# Patient Record
Sex: FEMALE | Race: WHITE | NOT HISPANIC OR LATINO
[De-identification: names, ages, dates, MRNs, and addresses within clinical notes are randomized per-mention and may not be internally consistent; named-entity substitution may affect disease eponyms.]

---

## 2017-01-09 ENCOUNTER — MEDICATION RENEWAL (OUTPATIENT)
Age: 37
End: 2017-01-09

## 2017-01-27 ENCOUNTER — APPOINTMENT (OUTPATIENT)
Dept: MAMMOGRAPHY | Facility: CLINIC | Age: 37
End: 2017-01-27

## 2017-02-10 ENCOUNTER — APPOINTMENT (OUTPATIENT)
Dept: MAMMOGRAPHY | Facility: CLINIC | Age: 37
End: 2017-02-10

## 2017-02-21 ENCOUNTER — FORM ENCOUNTER (OUTPATIENT)
Age: 37
End: 2017-02-21

## 2017-02-22 ENCOUNTER — OUTPATIENT (OUTPATIENT)
Dept: OUTPATIENT SERVICES | Facility: HOSPITAL | Age: 37
LOS: 1 days | End: 2017-02-22
Payer: COMMERCIAL

## 2017-02-22 ENCOUNTER — APPOINTMENT (OUTPATIENT)
Dept: MAMMOGRAPHY | Facility: CLINIC | Age: 37
End: 2017-02-22

## 2017-02-22 DIAGNOSIS — Z00.8 ENCOUNTER FOR OTHER GENERAL EXAMINATION: ICD-10-CM

## 2017-02-22 PROCEDURE — 77067 SCR MAMMO BI INCL CAD: CPT

## 2017-02-22 PROCEDURE — 77063 BREAST TOMOSYNTHESIS BI: CPT

## 2017-03-20 ENCOUNTER — APPOINTMENT (OUTPATIENT)
Dept: ENDOCRINOLOGY | Facility: CLINIC | Age: 37
End: 2017-03-20

## 2017-03-20 VITALS
DIASTOLIC BLOOD PRESSURE: 68 MMHG | WEIGHT: 131 LBS | BODY MASS INDEX: 23.21 KG/M2 | HEART RATE: 68 BPM | TEMPERATURE: 97.7 F | SYSTOLIC BLOOD PRESSURE: 102 MMHG | HEIGHT: 63 IN

## 2017-03-20 LAB
BASOPHILS # BLD AUTO: 0.04 K/UL
BASOPHILS NFR BLD AUTO: 0.7 %
EOSINOPHIL # BLD AUTO: 0.11 K/UL
EOSINOPHIL NFR BLD AUTO: 1.8 %
GLUCOSE BLDC GLUCOMTR-MCNC: 103
HCT VFR BLD CALC: 35.2 %
HGB BLD-MCNC: 11.9 G/DL
IMM GRANULOCYTES NFR BLD AUTO: 0.2 %
LYMPHOCYTES # BLD AUTO: 2.01 K/UL
LYMPHOCYTES NFR BLD AUTO: 32.8 %
MAN DIFF?: NORMAL
MCHC RBC-ENTMCNC: 28.1 PG
MCHC RBC-ENTMCNC: 33.8 GM/DL
MCV RBC AUTO: 83 FL
MONOCYTES # BLD AUTO: 0.41 K/UL
MONOCYTES NFR BLD AUTO: 6.7 %
NEUTROPHILS # BLD AUTO: 3.54 K/UL
NEUTROPHILS NFR BLD AUTO: 57.8 %
PLATELET # BLD AUTO: 197 K/UL
RBC # BLD: 4.24 M/UL
RBC # FLD: 12.6 %
WBC # FLD AUTO: 6.12 K/UL

## 2017-03-20 RX ORDER — CHROMIUM 200 MCG
1000 TABLET ORAL DAILY
Qty: 90 | Refills: 3 | Status: ACTIVE | COMMUNITY
Start: 2017-03-20 | End: 1900-01-01

## 2017-03-21 LAB
25(OH)D3 SERPL-MCNC: 26.2 NG/ML
ALBUMIN SERPL ELPH-MCNC: 4.5 G/DL
ALP BLD-CCNC: 53 U/L
ALT SERPL-CCNC: 8 U/L
ANION GAP SERPL CALC-SCNC: 15 MMOL/L
AST SERPL-CCNC: 14 U/L
BILIRUB SERPL-MCNC: 0.5 MG/DL
BUN SERPL-MCNC: 15 MG/DL
CALCIUM SERPL-MCNC: 9.4 MG/DL
CHLORIDE SERPL-SCNC: 102 MMOL/L
CHOLEST SERPL-MCNC: 183 MG/DL
CHOLEST/HDLC SERPL: 2.5 RATIO
CO2 SERPL-SCNC: 22 MMOL/L
CREAT SERPL-MCNC: 0.74 MG/DL
CREAT SPEC-SCNC: 52 MG/DL
GLUCOSE SERPL-MCNC: 113 MG/DL
HBA1C MFR BLD HPLC: 7.6 %
HDLC SERPL-MCNC: 72 MG/DL
LDLC SERPL CALC-MCNC: 99 MG/DL
MICROALBUMIN 24H UR DL<=1MG/L-MCNC: 0.9 MG/DL
MICROALBUMIN/CREAT 24H UR-RTO: 17 UG/MG
POTASSIUM SERPL-SCNC: 4.1 MMOL/L
PROT SERPL-MCNC: 6.8 G/DL
SODIUM SERPL-SCNC: 139 MMOL/L
TRIGL SERPL-MCNC: 62 MG/DL
TSH SERPL-ACNC: 1.13 UIU/ML

## 2017-03-27 ENCOUNTER — RESULT REVIEW (OUTPATIENT)
Age: 37
End: 2017-03-27

## 2017-03-31 ENCOUNTER — OUTPATIENT (OUTPATIENT)
Dept: OUTPATIENT SERVICES | Facility: HOSPITAL | Age: 37
LOS: 1 days | End: 2017-03-31
Payer: COMMERCIAL

## 2017-03-31 ENCOUNTER — APPOINTMENT (OUTPATIENT)
Dept: ULTRASOUND IMAGING | Facility: CLINIC | Age: 37
End: 2017-03-31

## 2017-03-31 DIAGNOSIS — Z00.8 ENCOUNTER FOR OTHER GENERAL EXAMINATION: ICD-10-CM

## 2017-03-31 PROCEDURE — 76856 US EXAM PELVIC COMPLETE: CPT

## 2017-03-31 PROCEDURE — 76830 TRANSVAGINAL US NON-OB: CPT

## 2017-05-04 ENCOUNTER — CLINICAL ADVICE (OUTPATIENT)
Age: 37
End: 2017-05-04

## 2017-05-24 LAB
ADJUSTED MITOGEN: >10 IU/ML
ADJUSTED TB AG: -0.07 IU/ML
M TB IFN-G BLD-IMP: NEGATIVE
QUANTIFERON GOLD NIL: 0.15 IU/ML

## 2017-08-15 ENCOUNTER — MEDICATION RENEWAL (OUTPATIENT)
Age: 37
End: 2017-08-15

## 2017-08-17 ENCOUNTER — RX RENEWAL (OUTPATIENT)
Age: 37
End: 2017-08-17

## 2017-10-03 ENCOUNTER — OTHER (OUTPATIENT)
Age: 37
End: 2017-10-03

## 2017-11-02 ENCOUNTER — APPOINTMENT (OUTPATIENT)
Dept: INTERNAL MEDICINE | Facility: CLINIC | Age: 37
End: 2017-11-02
Payer: COMMERCIAL

## 2017-11-02 VITALS
TEMPERATURE: 97.9 F | RESPIRATION RATE: 16 BRPM | DIASTOLIC BLOOD PRESSURE: 60 MMHG | HEART RATE: 71 BPM | SYSTOLIC BLOOD PRESSURE: 102 MMHG | OXYGEN SATURATION: 98 % | HEIGHT: 63 IN | BODY MASS INDEX: 23.21 KG/M2 | WEIGHT: 131 LBS

## 2017-11-02 DIAGNOSIS — Z80.3 FAMILY HISTORY OF MALIGNANT NEOPLASM OF BREAST: ICD-10-CM

## 2017-11-02 PROCEDURE — 99385 PREV VISIT NEW AGE 18-39: CPT | Mod: 25

## 2017-11-02 PROCEDURE — 99213 OFFICE O/P EST LOW 20 MIN: CPT | Mod: 25

## 2017-11-05 ENCOUNTER — TRANSCRIPTION ENCOUNTER (OUTPATIENT)
Age: 37
End: 2017-11-05

## 2017-11-06 ENCOUNTER — APPOINTMENT (OUTPATIENT)
Dept: RADIOLOGY | Facility: CLINIC | Age: 37
End: 2017-11-06

## 2017-11-07 LAB
25(OH)D3 SERPL-MCNC: 23.1 NG/ML
ALBUMIN SERPL ELPH-MCNC: 4.5 G/DL
ALP BLD-CCNC: 57 U/L
ALT SERPL-CCNC: 11 U/L
ANION GAP SERPL CALC-SCNC: 16 MMOL/L
APPEARANCE: CLEAR
AST SERPL-CCNC: 18 U/L
BASOPHILS # BLD AUTO: 0.03 K/UL
BASOPHILS NFR BLD AUTO: 0.5 %
BILIRUB SERPL-MCNC: 0.4 MG/DL
BILIRUBIN URINE: NEGATIVE
BLOOD URINE: NEGATIVE
BUN SERPL-MCNC: 14 MG/DL
CALCIUM SERPL-MCNC: 9.3 MG/DL
CHLORIDE SERPL-SCNC: 101 MMOL/L
CHOLEST SERPL-MCNC: 161 MG/DL
CHOLEST/HDLC SERPL: 2.4 RATIO
CO2 SERPL-SCNC: 24 MMOL/L
COLOR: YELLOW
CREAT SERPL-MCNC: 0.94 MG/DL
CREAT SPEC-SCNC: 68 MG/DL
EOSINOPHIL # BLD AUTO: 0.08 K/UL
EOSINOPHIL NFR BLD AUTO: 1.4 %
GLUCOSE QUALITATIVE U: NEGATIVE MG/DL
GLUCOSE SERPL-MCNC: 137 MG/DL
HBA1C MFR BLD HPLC: 7.3 %
HCT VFR BLD CALC: 35.9 %
HDLC SERPL-MCNC: 66 MG/DL
HGB BLD-MCNC: 11.9 G/DL
IMM GRANULOCYTES NFR BLD AUTO: 0.2 %
KETONES URINE: NEGATIVE
LDLC SERPL CALC-MCNC: 65 MG/DL
LEUKOCYTE ESTERASE URINE: NEGATIVE
LYMPHOCYTES # BLD AUTO: 1.75 K/UL
LYMPHOCYTES NFR BLD AUTO: 29.6 %
MAN DIFF?: NORMAL
MCHC RBC-ENTMCNC: 27.5 PG
MCHC RBC-ENTMCNC: 33.1 GM/DL
MCV RBC AUTO: 83.1 FL
MICROALBUMIN 24H UR DL<=1MG/L-MCNC: 0.7 MG/DL
MICROALBUMIN/CREAT 24H UR-RTO: 10 MG/G
MONOCYTES # BLD AUTO: 0.43 K/UL
MONOCYTES NFR BLD AUTO: 7.3 %
NEUTROPHILS # BLD AUTO: 3.61 K/UL
NEUTROPHILS NFR BLD AUTO: 61 %
NITRITE URINE: NEGATIVE
PH URINE: 7
PLATELET # BLD AUTO: 242 K/UL
POTASSIUM SERPL-SCNC: 4.3 MMOL/L
PROT SERPL-MCNC: 7.1 G/DL
PROTEIN URINE: NEGATIVE MG/DL
RBC # BLD: 4.32 M/UL
RBC # FLD: 12.7 %
SODIUM SERPL-SCNC: 141 MMOL/L
SPECIFIC GRAVITY URINE: 1.01
TRIGL SERPL-MCNC: 148 MG/DL
TSH SERPL-ACNC: 1.53 UIU/ML
UROBILINOGEN URINE: 1 MG/DL
WBC # FLD AUTO: 5.91 K/UL

## 2017-11-08 ENCOUNTER — TRANSCRIPTION ENCOUNTER (OUTPATIENT)
Age: 37
End: 2017-11-08

## 2017-11-08 LAB — GAD65 AB SER-MCNC: 0.12 NMOL/L

## 2017-12-10 DIAGNOSIS — R30.0 DYSURIA: ICD-10-CM

## 2018-01-02 PROBLEM — R30.0 DYSURIA: Status: ACTIVE | Noted: 2018-01-02

## 2018-02-21 ENCOUNTER — APPOINTMENT (OUTPATIENT)
Dept: ORTHOPEDIC SURGERY | Facility: CLINIC | Age: 38
End: 2018-02-21

## 2018-02-26 ENCOUNTER — APPOINTMENT (OUTPATIENT)
Dept: ENDOCRINOLOGY | Facility: CLINIC | Age: 38
End: 2018-02-26

## 2018-03-15 ENCOUNTER — RX RENEWAL (OUTPATIENT)
Age: 38
End: 2018-03-15

## 2018-03-20 ENCOUNTER — APPOINTMENT (OUTPATIENT)
Dept: DERMATOLOGY | Facility: CLINIC | Age: 38
End: 2018-03-20

## 2018-03-20 ENCOUNTER — APPOINTMENT (OUTPATIENT)
Dept: ENDOCRINOLOGY | Facility: CLINIC | Age: 38
End: 2018-03-20

## 2018-04-13 ENCOUNTER — TRANSCRIPTION ENCOUNTER (OUTPATIENT)
Age: 38
End: 2018-04-13

## 2018-04-13 ENCOUNTER — APPOINTMENT (OUTPATIENT)
Dept: ENDOCRINOLOGY | Facility: CLINIC | Age: 38
End: 2018-04-13
Payer: COMMERCIAL

## 2018-04-13 VITALS
DIASTOLIC BLOOD PRESSURE: 48 MMHG | TEMPERATURE: 98.5 F | RESPIRATION RATE: 16 BRPM | WEIGHT: 134 LBS | HEIGHT: 63 IN | BODY MASS INDEX: 23.74 KG/M2 | SYSTOLIC BLOOD PRESSURE: 82 MMHG

## 2018-04-13 LAB — GLUCOSE BLDC GLUCOMTR-MCNC: 189

## 2018-04-13 PROCEDURE — 82962 GLUCOSE BLOOD TEST: CPT

## 2018-04-13 PROCEDURE — 99214 OFFICE O/P EST MOD 30 MIN: CPT | Mod: 25

## 2018-04-16 LAB
BASOPHILS # BLD AUTO: 0.03 K/UL
BASOPHILS NFR BLD AUTO: 0.6 %
EOSINOPHIL # BLD AUTO: 0.05 K/UL
EOSINOPHIL NFR BLD AUTO: 1 %
HCT VFR BLD CALC: 34.3 %
HGB BLD-MCNC: 12 G/DL
IMM GRANULOCYTES NFR BLD AUTO: 0 %
LYMPHOCYTES # BLD AUTO: 1.86 K/UL
LYMPHOCYTES NFR BLD AUTO: 36 %
MAN DIFF?: NORMAL
MCHC RBC-ENTMCNC: 28.8 PG
MCHC RBC-ENTMCNC: 35 GM/DL
MCV RBC AUTO: 82.3 FL
MONOCYTES # BLD AUTO: 0.31 K/UL
MONOCYTES NFR BLD AUTO: 6 %
NEUTROPHILS # BLD AUTO: 2.91 K/UL
NEUTROPHILS NFR BLD AUTO: 56.4 %
PLATELET # BLD AUTO: 210 K/UL
RBC # BLD: 4.17 M/UL
RBC # FLD: 12.8 %
WBC # FLD AUTO: 5.16 K/UL

## 2018-04-19 LAB
ALBUMIN SERPL ELPH-MCNC: 4.5 G/DL
ALP BLD-CCNC: 51 U/L
ALT SERPL-CCNC: 10 U/L
ANION GAP SERPL CALC-SCNC: 13 MMOL/L
AST SERPL-CCNC: 14 U/L
BILIRUB SERPL-MCNC: 0.5 MG/DL
BUN SERPL-MCNC: 12 MG/DL
CALCIUM SERPL-MCNC: 9.8 MG/DL
CHLORIDE SERPL-SCNC: 100 MMOL/L
CHOLEST SERPL-MCNC: 184 MG/DL
CHOLEST/HDLC SERPL: 2.5 RATIO
CO2 SERPL-SCNC: 25 MMOL/L
CREAT SERPL-MCNC: 0.74 MG/DL
CREAT SPEC-SCNC: 38 MG/DL
FERRITIN SERPL-MCNC: 13 NG/ML
GLUCOSE SERPL-MCNC: 159 MG/DL
HBA1C MFR BLD HPLC: 7.8 %
HDLC SERPL-MCNC: 74 MG/DL
LDLC SERPL CALC-MCNC: 99 MG/DL
MICROALBUMIN 24H UR DL<=1MG/L-MCNC: 1.4 MG/DL
MICROALBUMIN/CREAT 24H UR-RTO: 37 MG/G
POTASSIUM SERPL-SCNC: 4.2 MMOL/L
PROT SERPL-MCNC: 6.9 G/DL
SODIUM SERPL-SCNC: 138 MMOL/L
TRIGL SERPL-MCNC: 55 MG/DL
TSH SERPL-ACNC: 1.27 UIU/ML

## 2018-05-14 ENCOUNTER — MEDICATION RENEWAL (OUTPATIENT)
Age: 38
End: 2018-05-14

## 2018-05-17 LAB
CREAT SPEC-SCNC: 63 MG/DL
MICROALBUMIN 24H UR DL<=1MG/L-MCNC: <0.3 MG/DL
MICROALBUMIN/CREAT 24H UR-RTO: NORMAL

## 2018-06-06 ENCOUNTER — APPOINTMENT (OUTPATIENT)
Dept: OBGYN | Facility: CLINIC | Age: 38
End: 2018-06-06
Payer: COMMERCIAL

## 2018-06-06 VITALS
HEIGHT: 63 IN | DIASTOLIC BLOOD PRESSURE: 70 MMHG | WEIGHT: 132 LBS | OXYGEN SATURATION: 97 % | SYSTOLIC BLOOD PRESSURE: 100 MMHG | BODY MASS INDEX: 23.39 KG/M2 | HEART RATE: 62 BPM | TEMPERATURE: 97.8 F

## 2018-06-06 PROCEDURE — 99385 PREV VISIT NEW AGE 18-39: CPT | Mod: 25

## 2018-06-13 LAB
CYTOLOGY CVX/VAG DOC THIN PREP: NORMAL
HPV HIGH+LOW RISK DNA PNL CVX: NOT DETECTED

## 2018-07-31 ENCOUNTER — APPOINTMENT (OUTPATIENT)
Dept: INTERNAL MEDICINE | Facility: CLINIC | Age: 38
End: 2018-07-31

## 2018-08-01 ENCOUNTER — MEDICATION RENEWAL (OUTPATIENT)
Age: 38
End: 2018-08-01

## 2018-08-10 ENCOUNTER — APPOINTMENT (OUTPATIENT)
Dept: MAMMOGRAPHY | Facility: CLINIC | Age: 38
End: 2018-08-10

## 2018-08-13 ENCOUNTER — APPOINTMENT (OUTPATIENT)
Dept: ENDOCRINOLOGY | Facility: CLINIC | Age: 38
End: 2018-08-13

## 2018-08-14 ENCOUNTER — MEDICATION RENEWAL (OUTPATIENT)
Age: 38
End: 2018-08-14

## 2018-08-16 ENCOUNTER — MEDICATION RENEWAL (OUTPATIENT)
Age: 38
End: 2018-08-16

## 2018-09-17 ENCOUNTER — FORM ENCOUNTER (OUTPATIENT)
Age: 38
End: 2018-09-17

## 2018-09-18 ENCOUNTER — APPOINTMENT (OUTPATIENT)
Dept: MAMMOGRAPHY | Facility: CLINIC | Age: 38
End: 2018-09-18
Payer: COMMERCIAL

## 2018-09-18 ENCOUNTER — OUTPATIENT (OUTPATIENT)
Dept: OUTPATIENT SERVICES | Facility: HOSPITAL | Age: 38
LOS: 1 days | End: 2018-09-18
Payer: COMMERCIAL

## 2018-09-18 ENCOUNTER — APPOINTMENT (OUTPATIENT)
Dept: RADIOLOGY | Facility: CLINIC | Age: 38
End: 2018-09-18
Payer: COMMERCIAL

## 2018-09-18 DIAGNOSIS — M25.512 PAIN IN LEFT SHOULDER: ICD-10-CM

## 2018-09-18 DIAGNOSIS — Z00.8 ENCOUNTER FOR OTHER GENERAL EXAMINATION: ICD-10-CM

## 2018-09-18 PROCEDURE — 77067 SCR MAMMO BI INCL CAD: CPT

## 2018-09-18 PROCEDURE — 77067 SCR MAMMO BI INCL CAD: CPT | Mod: 26

## 2018-09-18 PROCEDURE — 73030 X-RAY EXAM OF SHOULDER: CPT | Mod: 26,LT

## 2018-09-18 PROCEDURE — 77063 BREAST TOMOSYNTHESIS BI: CPT | Mod: 26

## 2018-09-18 PROCEDURE — 73030 X-RAY EXAM OF SHOULDER: CPT

## 2018-09-18 PROCEDURE — 77063 BREAST TOMOSYNTHESIS BI: CPT

## 2018-09-19 ENCOUNTER — APPOINTMENT (OUTPATIENT)
Dept: ORTHOPEDIC SURGERY | Facility: CLINIC | Age: 38
End: 2018-09-19
Payer: COMMERCIAL

## 2018-09-19 VITALS
HEART RATE: 65 BPM | SYSTOLIC BLOOD PRESSURE: 111 MMHG | WEIGHT: 132 LBS | HEIGHT: 63 IN | BODY MASS INDEX: 23.39 KG/M2 | DIASTOLIC BLOOD PRESSURE: 75 MMHG

## 2018-09-19 DIAGNOSIS — M99.81 OTHER BIOMECHANICAL LESIONS OF CERVICAL REGION: ICD-10-CM

## 2018-09-19 PROCEDURE — 99204 OFFICE O/P NEW MOD 45 MIN: CPT

## 2018-09-19 PROCEDURE — 73030 X-RAY EXAM OF SHOULDER: CPT | Mod: LT

## 2018-09-22 ENCOUNTER — TRANSCRIPTION ENCOUNTER (OUTPATIENT)
Age: 38
End: 2018-09-22

## 2018-09-25 ENCOUNTER — APPOINTMENT (OUTPATIENT)
Dept: CARDIOLOGY | Facility: CLINIC | Age: 38
End: 2018-09-25

## 2018-09-25 ENCOUNTER — APPOINTMENT (OUTPATIENT)
Dept: CARDIOLOGY | Facility: CLINIC | Age: 38
End: 2018-09-25
Payer: COMMERCIAL

## 2018-09-25 VITALS
HEIGHT: 63 IN | SYSTOLIC BLOOD PRESSURE: 94 MMHG | HEART RATE: 63 BPM | OXYGEN SATURATION: 98 % | WEIGHT: 132 LBS | DIASTOLIC BLOOD PRESSURE: 63 MMHG | BODY MASS INDEX: 23.39 KG/M2

## 2018-09-25 DIAGNOSIS — Z78.9 OTHER SPECIFIED HEALTH STATUS: ICD-10-CM

## 2018-09-25 DIAGNOSIS — R94.31 ABNORMAL ELECTROCARDIOGRAM [ECG] [EKG]: ICD-10-CM

## 2018-09-25 PROCEDURE — 99244 OFF/OP CNSLTJ NEW/EST MOD 40: CPT

## 2018-11-09 ENCOUNTER — APPOINTMENT (OUTPATIENT)
Dept: NEUROLOGY | Facility: CLINIC | Age: 38
End: 2018-11-09
Payer: COMMERCIAL

## 2018-11-09 VITALS
WEIGHT: 137 LBS | SYSTOLIC BLOOD PRESSURE: 124 MMHG | OXYGEN SATURATION: 98 % | BODY MASS INDEX: 24.27 KG/M2 | HEART RATE: 68 BPM | DIASTOLIC BLOOD PRESSURE: 74 MMHG | TEMPERATURE: 97.9 F | HEIGHT: 63 IN

## 2018-11-09 DIAGNOSIS — M54.2 CERVICALGIA: ICD-10-CM

## 2018-11-09 DIAGNOSIS — M79.2 NEURALGIA AND NEURITIS, UNSPECIFIED: ICD-10-CM

## 2018-11-09 PROCEDURE — 99244 OFF/OP CNSLTJ NEW/EST MOD 40: CPT

## 2018-11-14 ENCOUNTER — OUTPATIENT (OUTPATIENT)
Dept: OUTPATIENT SERVICES | Facility: HOSPITAL | Age: 38
LOS: 1 days | End: 2018-11-14
Payer: COMMERCIAL

## 2018-11-14 ENCOUNTER — APPOINTMENT (OUTPATIENT)
Dept: RADIOLOGY | Facility: CLINIC | Age: 38
End: 2018-11-14
Payer: COMMERCIAL

## 2018-11-14 DIAGNOSIS — Z00.8 ENCOUNTER FOR OTHER GENERAL EXAMINATION: ICD-10-CM

## 2018-11-14 PROCEDURE — 72040 X-RAY EXAM NECK SPINE 2-3 VW: CPT | Mod: 26

## 2018-11-14 PROCEDURE — 72040 X-RAY EXAM NECK SPINE 2-3 VW: CPT

## 2018-11-20 ENCOUNTER — APPOINTMENT (OUTPATIENT)
Dept: INTERNAL MEDICINE | Facility: CLINIC | Age: 38
End: 2018-11-20

## 2018-12-14 ENCOUNTER — APPOINTMENT (OUTPATIENT)
Dept: ENDOCRINOLOGY | Facility: CLINIC | Age: 38
End: 2018-12-14
Payer: COMMERCIAL

## 2018-12-14 VITALS
HEIGHT: 63 IN | OXYGEN SATURATION: 98 % | HEART RATE: 85 BPM | DIASTOLIC BLOOD PRESSURE: 70 MMHG | BODY MASS INDEX: 23.39 KG/M2 | SYSTOLIC BLOOD PRESSURE: 110 MMHG | WEIGHT: 132 LBS

## 2018-12-14 LAB
GLUCOSE BLDC GLUCOMTR-MCNC: 176
HBA1C MFR BLD HPLC: 7.2

## 2018-12-14 PROCEDURE — 82962 GLUCOSE BLOOD TEST: CPT

## 2018-12-14 PROCEDURE — 99244 OFF/OP CNSLTJ NEW/EST MOD 40: CPT | Mod: 25

## 2018-12-14 PROCEDURE — 83036 HEMOGLOBIN GLYCOSYLATED A1C: CPT | Mod: QW

## 2018-12-17 LAB
ANION GAP SERPL CALC-SCNC: 11 MMOL/L
BUN SERPL-MCNC: 17 MG/DL
CALCIUM SERPL-MCNC: 9 MG/DL
CHLORIDE SERPL-SCNC: 107 MMOL/L
CHOLEST SERPL-MCNC: 162 MG/DL
CHOLEST/HDLC SERPL: 2.8 RATIO
CO2 SERPL-SCNC: 23 MMOL/L
CREAT SERPL-MCNC: 0.87 MG/DL
CREAT SPEC-SCNC: 117 MG/DL
GLUCOSE SERPL-MCNC: 180 MG/DL
HDLC SERPL-MCNC: 58 MG/DL
LDLC SERPL CALC-MCNC: 79 MG/DL
MICROALBUMIN 24H UR DL<=1MG/L-MCNC: <1.2 MG/DL
MICROALBUMIN/CREAT 24H UR-RTO: NORMAL
POTASSIUM SERPL-SCNC: 4.8 MMOL/L
SODIUM SERPL-SCNC: 141 MMOL/L
TRIGL SERPL-MCNC: 125 MG/DL

## 2018-12-27 ENCOUNTER — APPOINTMENT (OUTPATIENT)
Dept: GASTROENTEROLOGY | Facility: CLINIC | Age: 38
End: 2018-12-27
Payer: COMMERCIAL

## 2018-12-27 VITALS
SYSTOLIC BLOOD PRESSURE: 109 MMHG | BODY MASS INDEX: 23.39 KG/M2 | WEIGHT: 132 LBS | RESPIRATION RATE: 14 BRPM | TEMPERATURE: 98 F | HEIGHT: 63 IN | OXYGEN SATURATION: 99 % | HEART RATE: 79 BPM | DIASTOLIC BLOOD PRESSURE: 64 MMHG

## 2018-12-27 PROCEDURE — 99203 OFFICE O/P NEW LOW 30 MIN: CPT

## 2019-01-07 ENCOUNTER — OUTPATIENT (OUTPATIENT)
Dept: OUTPATIENT SERVICES | Facility: HOSPITAL | Age: 39
LOS: 1 days | End: 2019-01-07
Payer: COMMERCIAL

## 2019-01-07 DIAGNOSIS — Z83.79 FAMILY HISTORY OF OTHER DISEASES OF THE DIGESTIVE SYSTEM: ICD-10-CM

## 2019-01-07 PROCEDURE — 45378 DIAGNOSTIC COLONOSCOPY: CPT

## 2019-01-07 PROCEDURE — G0105: CPT

## 2019-01-07 PROCEDURE — 82962 GLUCOSE BLOOD TEST: CPT

## 2019-02-06 ENCOUNTER — TRANSCRIPTION ENCOUNTER (OUTPATIENT)
Age: 39
End: 2019-02-06

## 2019-03-01 ENCOUNTER — APPOINTMENT (OUTPATIENT)
Dept: INTERNAL MEDICINE | Facility: CLINIC | Age: 39
End: 2019-03-01
Payer: COMMERCIAL

## 2019-03-01 VITALS
OXYGEN SATURATION: 98 % | WEIGHT: 136 LBS | BODY MASS INDEX: 23.8 KG/M2 | HEIGHT: 63.5 IN | HEART RATE: 78 BPM | SYSTOLIC BLOOD PRESSURE: 100 MMHG | DIASTOLIC BLOOD PRESSURE: 60 MMHG

## 2019-03-01 DIAGNOSIS — Z23 ENCOUNTER FOR IMMUNIZATION: ICD-10-CM

## 2019-03-01 PROCEDURE — 90715 TDAP VACCINE 7 YRS/> IM: CPT

## 2019-03-01 PROCEDURE — G0442 ANNUAL ALCOHOL SCREEN 15 MIN: CPT

## 2019-03-01 PROCEDURE — G0444 DEPRESSION SCREEN ANNUAL: CPT

## 2019-03-01 PROCEDURE — 99386 PREV VISIT NEW AGE 40-64: CPT | Mod: 25

## 2019-03-01 PROCEDURE — 90471 IMMUNIZATION ADMIN: CPT

## 2019-03-01 RX ORDER — DICLOFENAC SODIUM 50 MG/1
50 TABLET, DELAYED RELEASE ORAL
Qty: 60 | Refills: 1 | Status: DISCONTINUED | COMMUNITY
Start: 2018-09-19 | End: 2019-03-01

## 2019-03-01 RX ORDER — NAPROXEN 375 MG/1
375 TABLET ORAL
Qty: 30 | Refills: 2 | Status: DISCONTINUED | COMMUNITY
Start: 2018-11-09 | End: 2019-03-01

## 2019-03-01 RX ORDER — BLOOD SUGAR DIAGNOSTIC
STRIP MISCELLANEOUS 4 TIMES DAILY
Qty: 360 | Refills: 3 | Status: DISCONTINUED | COMMUNITY
Start: 2017-01-09 | End: 2019-03-01

## 2019-03-03 ENCOUNTER — TRANSCRIPTION ENCOUNTER (OUTPATIENT)
Age: 39
End: 2019-03-03

## 2019-03-03 LAB
25(OH)D3 SERPL-MCNC: 33.5 NG/ML
ALBUMIN SERPL ELPH-MCNC: 4.9 G/DL
ALP BLD-CCNC: 59 U/L
ALT SERPL-CCNC: 8 U/L
ANION GAP SERPL CALC-SCNC: 13 MMOL/L
AST SERPL-CCNC: 11 U/L
BASOPHILS # BLD AUTO: 0.05 K/UL
BASOPHILS NFR BLD AUTO: 0.9 %
BILIRUB SERPL-MCNC: 0.6 MG/DL
BUN SERPL-MCNC: 12 MG/DL
CALCIUM SERPL-MCNC: 9.5 MG/DL
CHLORIDE SERPL-SCNC: 104 MMOL/L
CHOLEST SERPL-MCNC: 168 MG/DL
CHOLEST/HDLC SERPL: 2.5 RATIO
CO2 SERPL-SCNC: 24 MMOL/L
CREAT SERPL-MCNC: 0.65 MG/DL
EOSINOPHIL # BLD AUTO: 0.05 K/UL
EOSINOPHIL NFR BLD AUTO: 0.9 %
GLUCOSE SERPL-MCNC: 134 MG/DL
HBA1C MFR BLD HPLC: 7.2 %
HBV SURFACE AB SER QL: NONREACTIVE
HBV SURFACE AG SER QL: NONREACTIVE
HCT VFR BLD CALC: 40.1 %
HCV AB SER QL: NONREACTIVE
HCV S/CO RATIO: 0.11 S/CO
HDLC SERPL-MCNC: 68 MG/DL
HGB BLD-MCNC: 12.7 G/DL
IMM GRANULOCYTES NFR BLD AUTO: 0.2 %
LDLC SERPL CALC-MCNC: 93 MG/DL
LYMPHOCYTES # BLD AUTO: 1.69 K/UL
LYMPHOCYTES NFR BLD AUTO: 32.1 %
MAN DIFF?: NORMAL
MCHC RBC-ENTMCNC: 28.9 PG
MCHC RBC-ENTMCNC: 31.7 GM/DL
MCV RBC AUTO: 91.1 FL
MONOCYTES # BLD AUTO: 0.33 K/UL
MONOCYTES NFR BLD AUTO: 6.3 %
NEUTROPHILS # BLD AUTO: 3.14 K/UL
NEUTROPHILS NFR BLD AUTO: 59.6 %
PLATELET # BLD AUTO: 213 K/UL
POTASSIUM SERPL-SCNC: 4.5 MMOL/L
PROT SERPL-MCNC: 6.8 G/DL
RBC # BLD: 4.4 M/UL
RBC # FLD: 12.2 %
SODIUM SERPL-SCNC: 141 MMOL/L
T4 FREE SERPL-MCNC: 1.4 NG/DL
TRIGL SERPL-MCNC: 34 MG/DL
TSH SERPL-ACNC: 1.99 UIU/ML
WBC # FLD AUTO: 5.27 K/UL

## 2019-03-12 ENCOUNTER — TRANSCRIPTION ENCOUNTER (OUTPATIENT)
Age: 39
End: 2019-03-12

## 2019-04-08 ENCOUNTER — TRANSCRIPTION ENCOUNTER (OUTPATIENT)
Age: 39
End: 2019-04-08

## 2019-04-16 ENCOUNTER — APPOINTMENT (OUTPATIENT)
Dept: PULMONOLOGY | Facility: CLINIC | Age: 39
End: 2019-04-16
Payer: COMMERCIAL

## 2019-04-16 VITALS
TEMPERATURE: 98.2 F | DIASTOLIC BLOOD PRESSURE: 72 MMHG | HEART RATE: 69 BPM | OXYGEN SATURATION: 98 % | SYSTOLIC BLOOD PRESSURE: 108 MMHG | HEIGHT: 63.5 IN

## 2019-04-16 DIAGNOSIS — R06.00 DYSPNEA, UNSPECIFIED: ICD-10-CM

## 2019-04-16 PROCEDURE — 94726 PLETHYSMOGRAPHY LUNG VOLUMES: CPT

## 2019-04-16 PROCEDURE — 99203 OFFICE O/P NEW LOW 30 MIN: CPT | Mod: 25

## 2019-04-16 PROCEDURE — 94729 DIFFUSING CAPACITY: CPT

## 2019-04-16 PROCEDURE — 94010 BREATHING CAPACITY TEST: CPT

## 2019-04-16 PROCEDURE — ZZZZZ: CPT

## 2019-04-16 NOTE — ASSESSMENT
[FreeTextEntry1] : 1) Intermittent dyspnea- Unclear etiology. Not related to exertion or clear triggers. Sporadic.

## 2019-04-16 NOTE — REVIEW OF SYSTEMS
[Back Pain] : ~T back pain [Dyspnea] : dyspnea [Diabetes] : diabetes mellitus [Negative] : Hematologic

## 2019-04-16 NOTE — PHYSICAL EXAM
[General Appearance - Well Developed] : well developed [Normal Appearance] : normal appearance [Well Groomed] : well groomed [General Appearance - Well Nourished] : well nourished [General Appearance - In No Acute Distress] : no acute distress [No Deformities] : no deformities [Normal Oropharynx] : normal oropharynx [Normal Conjunctiva] : the conjunctiva exhibited no abnormalities [Neck Appearance] : the appearance of the neck was normal [Heart Rate And Rhythm] : heart rate and rhythm were normal [Neck Cervical Mass (___cm)] : no neck mass was observed [Arterial Pulses Normal] : the arterial pulses were normal [Murmurs] : no murmurs present [Heart Sounds] : normal S1 and S2 [Edema] : no peripheral edema present [Respiration, Rhythm And Depth] : normal respiratory rhythm and effort [Exaggerated Use Of Accessory Muscles For Inspiration] : no accessory muscle use [Bowel Sounds] : normal bowel sounds [Auscultation Breath Sounds / Voice Sounds] : lungs were clear to auscultation bilaterally [Abdomen Tenderness] : non-tender [Abdomen Soft] : soft [Nail Clubbing] : no clubbing of the fingernails [Cyanosis, Localized] : no localized cyanosis [Abnormal Walk] : normal gait [Petechial Hemorrhages (___cm)] : no petechial hemorrhages [Skin Color & Pigmentation] : normal skin color and pigmentation [] : no rash [Skin Turgor] : normal skin turgor [No Focal Deficits] : no focal deficits [Oriented To Time, Place, And Person] : oriented to person, place, and time [Impaired Insight] : insight and judgment were intact

## 2019-04-24 ENCOUNTER — FORM ENCOUNTER (OUTPATIENT)
Age: 39
End: 2019-04-24

## 2019-04-25 ENCOUNTER — OUTPATIENT (OUTPATIENT)
Dept: OUTPATIENT SERVICES | Facility: HOSPITAL | Age: 39
LOS: 1 days | End: 2019-04-25
Payer: COMMERCIAL

## 2019-04-25 ENCOUNTER — APPOINTMENT (OUTPATIENT)
Dept: RADIOLOGY | Facility: CLINIC | Age: 39
End: 2019-04-25
Payer: COMMERCIAL

## 2019-04-25 DIAGNOSIS — R06.00 DYSPNEA, UNSPECIFIED: ICD-10-CM

## 2019-04-25 PROCEDURE — 71046 X-RAY EXAM CHEST 2 VIEWS: CPT | Mod: 26

## 2019-04-25 PROCEDURE — 71046 X-RAY EXAM CHEST 2 VIEWS: CPT

## 2019-04-30 ENCOUNTER — TRANSCRIPTION ENCOUNTER (OUTPATIENT)
Age: 39
End: 2019-04-30

## 2019-05-21 ENCOUNTER — APPOINTMENT (OUTPATIENT)
Dept: CV DIAGNOSITCS | Facility: HOSPITAL | Age: 39
End: 2019-05-21

## 2019-06-20 ENCOUNTER — APPOINTMENT (OUTPATIENT)
Dept: ENDOCRINOLOGY | Facility: CLINIC | Age: 39
End: 2019-06-20

## 2019-06-27 ENCOUNTER — OTHER (OUTPATIENT)
Age: 39
End: 2019-06-27

## 2019-06-28 ENCOUNTER — OTHER (OUTPATIENT)
Age: 39
End: 2019-06-28

## 2019-07-02 ENCOUNTER — OTHER (OUTPATIENT)
Age: 39
End: 2019-07-02

## 2019-07-24 ENCOUNTER — APPOINTMENT (OUTPATIENT)
Dept: ENDOCRINOLOGY | Facility: CLINIC | Age: 39
End: 2019-07-24
Payer: COMMERCIAL

## 2019-07-24 VITALS
SYSTOLIC BLOOD PRESSURE: 106 MMHG | BODY MASS INDEX: 22.49 KG/M2 | WEIGHT: 129 LBS | HEART RATE: 70 BPM | DIASTOLIC BLOOD PRESSURE: 62 MMHG | OXYGEN SATURATION: 98 %

## 2019-07-24 LAB
GLUCOSE BLDC GLUCOMTR-MCNC: 121
HBA1C MFR BLD HPLC: 6.3

## 2019-07-24 PROCEDURE — 82962 GLUCOSE BLOOD TEST: CPT

## 2019-07-24 PROCEDURE — 83036 HEMOGLOBIN GLYCOSYLATED A1C: CPT | Mod: QW

## 2019-07-24 PROCEDURE — 99214 OFFICE O/P EST MOD 30 MIN: CPT | Mod: 25

## 2019-07-24 RX ORDER — GABAPENTIN 300 MG/1
300 CAPSULE ORAL 3 TIMES DAILY
Qty: 90 | Refills: 1 | Status: DISCONTINUED | COMMUNITY
Start: 2018-11-09 | End: 2019-07-24

## 2019-07-24 NOTE — HISTORY OF PRESENT ILLNESS
[FreeTextEntry1] : Diabetes Follow-up Patient HPI\par \par \par HPI:\par \par Duration of Diabetes: 8 years \par Is patient on Insulin? Yes, 8 years \par If yes, how long on insulin? Insulin pump for 4 years ( omnipod with G5) \par \par List Current Medications for Glycemic control and the doses:\par 1- Insulin pump ( Omnipod with DEXCOM G6) \par 2- \par 3- \par \par SMBG (self monitored blood glucose) readings: \par - Name of glucometer: \par - How often does the patient check BG? \par - Does the patient keep a log? \par \par If detailed record is available, what is the range of most of the BG readings?\par - Before Breakfast: 122-150\par - Before Lunch: 109-120\par - Before Dinner: 120-160\par - Before Bedtime: 180-250\par \par \par Does patient get Hypoglycemic episodes? None at all \par \par \par Diabetic Complications: Is patient aware of having any of those complications?\par - Eyes: Retinopathy? None \par  	When was the last fully dilated eye exam? pending appt next week \par - Feet: 	Neuropathy? None \par  	Foot Ulcers? None \par 	When was the last time patient saw a Podiatrist? None \par - Kidneys: Nephropathy? GFr of 111, microalb/cr: 37 \par \par \par Diet: review patient's diet: \par Breakfast: peanut butter\par Lunch: Salad, turkey, bread \par Dinner: Salad\par Snacks: Random things \par Juice or soda: None \par Desserts: None \par \par \par Exercise: review patient exercise habits: \par Peloton x 3 times per week \par \par Symptoms: Write any symptoms, concerns and issues bothering the patient which have not be addressed above: \par No blurry vision, thirst or urination \par \par  \par

## 2019-07-24 NOTE — REVIEW OF SYSTEMS
[Fatigue] : no fatigue [Decreased Appetite] : appetite not decreased [Recent Weight Gain (___ Lbs)] : no recent weight gain [Recent Weight Loss (___ Lbs)] : no recent weight loss [Visual Field Defect] : no visual field defect [Blurry Vision] : no blurred vision [Dry Eyes] : no dryness of the eyes [Dysphagia] : no dysphagia [Eyes Itch] : no itching of the eyes [Dysphonia] : no dysphonia [Chest Pain] : no chest pain [Palpitations] : no palpitations [Heart Rate Is Slow] : the heart rate was not slow [Shortness Of Breath] : no shortness of breath [Heart Rate Is Fast] : the heart rate was not fast [Cough] : no cough [Wheezing] : no wheezing was heard [Nausea] : no nausea [SOB on Exertion] : no shortness of breath during exertion [Vomiting] : no vomiting was observed [Irregular Menses] : regular menses [Polyuria] : no polyuria [Joint Pain] : no joint pain [Joint Stiffness] : no joint stiffness [Muscle Weakness] : no muscle weakness [Acanthosis] : no acanthosis  [Muscle Cramps] : no muscle cramps [Hirsutism] : no hirsutism [Hair Loss] : no hair loss [Acne] : no acne [Headache] : no headaches [Tremors] : no tremors [Depression] : no depression [Anxiety] : no anxiety [Polydipsia] : no polydipsia [Galactorrhea] : no galactorrhea  [Cold Intolerance] : cold tolerant [Heat Intolerance] : heat tolerant [Easy Bleeding] : no ~M tendency for easy bleeding [Swelling] : no swelling [Easy Bruising] : no tendency for easy bruising

## 2019-07-24 NOTE — PHYSICAL EXAM
[Alert] : alert [No Acute Distress] : no acute distress [Well Nourished] : well nourished [Well Developed] : well developed [Normal Sclera/Conjunctiva] : normal sclera/conjunctiva [PERRL] : pupils equal, round and reactive to light [EOMI] : extra ocular movement intact [No Proptosis] : no proptosis [Normal Outer Ear/Nose] : the ears and nose were normal in appearance [Normal TMs] : both tympanic membranes were normal [No Neck Mass] : no neck mass was observed [Normal Hearing] : hearing was normal [Thyroid Not Enlarged] : the thyroid was not enlarged [Supple] : the neck was supple [No Respiratory Distress] : no respiratory distress [Normal Rate and Effort] : normal respiratory rhythm and effort [Clear to Auscultation] : lungs were clear to auscultation bilaterally [Normal Rate] : heart rate was normal  [Normal S1, S2] : normal S1 and S2 [Regular Rhythm] : with a regular rhythm [Not Tender] : non-tender [Normal Bowel Sounds] : normal bowel sounds [Soft] : abdomen soft [Not Distended] : not distended [No CVA Tenderness] : no ~M costovertebral angle tenderness [Kyphosis] : no kyphosis present [Scoliosis] : scoliosis not present [Normal Gait] : normal gait [No Joint Swelling] : no joint swelling seen [No Clubbing, Cyanosis] : no clubbing  or cyanosis of the fingernails [Normal Strength/Tone] : muscle strength and tone were normal [No Rash] : no rash [Foot Ulcers] : no foot ulcers [No Skin Lesions] : no skin lesions [Acne] : no acne [Normal Reflexes] : deep tendon reflexes were 2+ and symmetric [No Motor Deficits] : the motor exam was normal [No Tremors] : no tremors [Oriented x3] : oriented to person, place, and time [Normal Insight/Judgement] : insight and judgment were intact [Normal Affect] : the affect was normal [Normal Mood] : the mood was normal

## 2019-07-24 NOTE — ASSESSMENT
[FreeTextEntry1] : 38 year old female with history of DM Type 1 here for follow up. She is currently managed on the omnipod and DEXCOM G6. HgA1C of 6.3%. Some hyperglycemia post dinner. \par Overall, quite well controlled, will not be changing the settings of her pump. Advised to initiate a temp basal of 50% when exercising. \par \par -Bloodwork on next visit \par -Will need optho/podiatry appointments\par -SMBGs as appropriate \par -Low carb consistent diet\par \par -Follow up in 5-6 months. \par

## 2019-07-29 ENCOUNTER — APPOINTMENT (OUTPATIENT)
Dept: CV DIAGNOSITCS | Facility: HOSPITAL | Age: 39
End: 2019-07-29

## 2019-09-17 ENCOUNTER — INPATIENT (INPATIENT)
Facility: HOSPITAL | Age: 39
LOS: 0 days | Discharge: HOME | DRG: 919 | End: 2019-09-18
Attending: INTERNAL MEDICINE | Admitting: INTERNAL MEDICINE
Payer: COMMERCIAL

## 2019-09-17 VITALS
SYSTOLIC BLOOD PRESSURE: 108 MMHG | TEMPERATURE: 98 F | OXYGEN SATURATION: 100 % | HEIGHT: 62 IN | RESPIRATION RATE: 20 BRPM | DIASTOLIC BLOOD PRESSURE: 76 MMHG | WEIGHT: 134.04 LBS | HEART RATE: 102 BPM

## 2019-09-17 DIAGNOSIS — E10.10 TYPE 1 DIABETES MELLITUS WITH KETOACIDOSIS WITHOUT COMA: ICD-10-CM

## 2019-09-17 DIAGNOSIS — I10 ESSENTIAL (PRIMARY) HYPERTENSION: ICD-10-CM

## 2019-09-17 DIAGNOSIS — E78.5 HYPERLIPIDEMIA, UNSPECIFIED: ICD-10-CM

## 2019-09-17 DIAGNOSIS — E11.10 TYPE 2 DIABETES MELLITUS WITH KETOACIDOSIS WITHOUT COMA: ICD-10-CM

## 2019-09-17 DIAGNOSIS — Z96.41 PRESENCE OF INSULIN PUMP (EXTERNAL) (INTERNAL): ICD-10-CM

## 2019-09-17 LAB
ALBUMIN SERPL ELPH-MCNC: 3.4 G/DL — SIGNIFICANT CHANGE UP (ref 3.3–5)
ALBUMIN SERPL ELPH-MCNC: 3.8 G/DL — SIGNIFICANT CHANGE UP (ref 3.3–5)
ALBUMIN SERPL ELPH-MCNC: 4.8 G/DL — SIGNIFICANT CHANGE UP (ref 3.3–5)
ALP SERPL-CCNC: 42 U/L — SIGNIFICANT CHANGE UP (ref 40–120)
ALP SERPL-CCNC: 47 U/L — SIGNIFICANT CHANGE UP (ref 40–120)
ALP SERPL-CCNC: 73 U/L — SIGNIFICANT CHANGE UP (ref 40–120)
ALT FLD-CCNC: 10 U/L — SIGNIFICANT CHANGE UP (ref 10–45)
ALT FLD-CCNC: 7 U/L — LOW (ref 10–45)
ALT FLD-CCNC: 8 U/L — LOW (ref 10–45)
ANION GAP SERPL CALC-SCNC: 11 MMOL/L — SIGNIFICANT CHANGE UP (ref 5–17)
ANION GAP SERPL CALC-SCNC: 14 MMOL/L — SIGNIFICANT CHANGE UP (ref 5–17)
ANION GAP SERPL CALC-SCNC: 19 MMOL/L — HIGH (ref 5–17)
ANION GAP SERPL CALC-SCNC: 24 MMOL/L — HIGH (ref 5–17)
APPEARANCE UR: CLEAR — SIGNIFICANT CHANGE UP
AST SERPL-CCNC: 12 U/L — SIGNIFICANT CHANGE UP (ref 10–40)
AST SERPL-CCNC: 13 U/L — SIGNIFICANT CHANGE UP (ref 10–40)
AST SERPL-CCNC: 15 U/L — SIGNIFICANT CHANGE UP (ref 10–40)
B-OH-BUTYR SERPL-SCNC: 1.5 MMOL/L — HIGH
B-OH-BUTYR SERPL-SCNC: 4.2 MMOL/L — HIGH
BACTERIA # UR AUTO: NEGATIVE — SIGNIFICANT CHANGE UP
BASE EXCESS BLDV CALC-SCNC: -12.9 MMOL/L — LOW (ref -2–2)
BASE EXCESS BLDV CALC-SCNC: -7.7 MMOL/L — LOW (ref -2–2)
BASE EXCESS BLDV CALC-SCNC: -8.5 MMOL/L — LOW (ref -2–2)
BASE EXCESS BLDV CALC-SCNC: -9.3 MMOL/L — LOW (ref -2–2)
BASOPHILS # BLD AUTO: 0.1 K/UL — SIGNIFICANT CHANGE UP (ref 0–0.2)
BASOPHILS NFR BLD AUTO: 0.9 % — SIGNIFICANT CHANGE UP (ref 0–2)
BILIRUB SERPL-MCNC: 0.4 MG/DL — SIGNIFICANT CHANGE UP (ref 0.2–1.2)
BILIRUB SERPL-MCNC: 0.6 MG/DL — SIGNIFICANT CHANGE UP (ref 0.2–1.2)
BILIRUB SERPL-MCNC: 0.6 MG/DL — SIGNIFICANT CHANGE UP (ref 0.2–1.2)
BILIRUB UR-MCNC: NEGATIVE — SIGNIFICANT CHANGE UP
BUN SERPL-MCNC: 14 MG/DL — SIGNIFICANT CHANGE UP (ref 7–23)
BUN SERPL-MCNC: 16 MG/DL — SIGNIFICANT CHANGE UP (ref 7–23)
BUN SERPL-MCNC: 21 MG/DL — SIGNIFICANT CHANGE UP (ref 7–23)
BUN SERPL-MCNC: 22 MG/DL — SIGNIFICANT CHANGE UP (ref 7–23)
CA-I SERPL-SCNC: 1.14 MMOL/L — SIGNIFICANT CHANGE UP (ref 1.12–1.3)
CA-I SERPL-SCNC: 1.16 MMOL/L — SIGNIFICANT CHANGE UP (ref 1.12–1.3)
CA-I SERPL-SCNC: 1.18 MMOL/L — SIGNIFICANT CHANGE UP (ref 1.12–1.3)
CA-I SERPL-SCNC: 1.21 MMOL/L — SIGNIFICANT CHANGE UP (ref 1.12–1.3)
CALCIUM SERPL-MCNC: 7.4 MG/DL — LOW (ref 8.4–10.5)
CALCIUM SERPL-MCNC: 7.9 MG/DL — LOW (ref 8.4–10.5)
CALCIUM SERPL-MCNC: 8.3 MG/DL — LOW (ref 8.4–10.5)
CALCIUM SERPL-MCNC: 9.4 MG/DL — SIGNIFICANT CHANGE UP (ref 8.4–10.5)
CHLORIDE BLDV-SCNC: 107 MMOL/L — SIGNIFICANT CHANGE UP (ref 96–108)
CHLORIDE BLDV-SCNC: 108 MMOL/L — SIGNIFICANT CHANGE UP (ref 96–108)
CHLORIDE BLDV-SCNC: 109 MMOL/L — HIGH (ref 96–108)
CHLORIDE BLDV-SCNC: 111 MMOL/L — HIGH (ref 96–108)
CHLORIDE SERPL-SCNC: 104 MMOL/L — SIGNIFICANT CHANGE UP (ref 96–108)
CHLORIDE SERPL-SCNC: 107 MMOL/L — SIGNIFICANT CHANGE UP (ref 96–108)
CHLORIDE SERPL-SCNC: 108 MMOL/L — SIGNIFICANT CHANGE UP (ref 96–108)
CHLORIDE SERPL-SCNC: 99 MMOL/L — SIGNIFICANT CHANGE UP (ref 96–108)
CHOLEST SERPL-MCNC: 147 MG/DL — SIGNIFICANT CHANGE UP (ref 10–199)
CK MB CFR SERPL CALC: 1.4 NG/ML — SIGNIFICANT CHANGE UP (ref 0–3.8)
CK MB CFR SERPL CALC: 1.6 NG/ML — SIGNIFICANT CHANGE UP (ref 0–3.8)
CK SERPL-CCNC: 139 U/L — SIGNIFICANT CHANGE UP (ref 25–170)
CK SERPL-CCNC: 87 U/L — SIGNIFICANT CHANGE UP (ref 25–170)
CO2 BLDV-SCNC: 16 MMOL/L — LOW (ref 22–30)
CO2 BLDV-SCNC: 16 MMOL/L — LOW (ref 22–30)
CO2 BLDV-SCNC: 17 MMOL/L — LOW (ref 22–30)
CO2 BLDV-SCNC: 18 MMOL/L — LOW (ref 22–30)
CO2 SERPL-SCNC: 11 MMOL/L — LOW (ref 22–31)
CO2 SERPL-SCNC: 12 MMOL/L — LOW (ref 22–31)
CO2 SERPL-SCNC: 15 MMOL/L — LOW (ref 22–31)
CO2 SERPL-SCNC: 17 MMOL/L — LOW (ref 22–31)
COLOR SPEC: SIGNIFICANT CHANGE UP
CREAT SERPL-MCNC: 0.65 MG/DL — SIGNIFICANT CHANGE UP (ref 0.5–1.3)
CREAT SERPL-MCNC: 0.71 MG/DL — SIGNIFICANT CHANGE UP (ref 0.5–1.3)
CREAT SERPL-MCNC: 0.78 MG/DL — SIGNIFICANT CHANGE UP (ref 0.5–1.3)
CREAT SERPL-MCNC: 0.81 MG/DL — SIGNIFICANT CHANGE UP (ref 0.5–1.3)
DIFF PNL FLD: ABNORMAL
EOSINOPHIL # BLD AUTO: 0.1 K/UL — SIGNIFICANT CHANGE UP (ref 0–0.5)
EOSINOPHIL NFR BLD AUTO: 1.6 % — SIGNIFICANT CHANGE UP (ref 0–6)
EPI CELLS # UR: 1 /HPF — SIGNIFICANT CHANGE UP
GAS PNL BLDV: 134 MMOL/L — LOW (ref 135–145)
GAS PNL BLDV: 135 MMOL/L — SIGNIFICANT CHANGE UP (ref 135–145)
GAS PNL BLDV: 136 MMOL/L — SIGNIFICANT CHANGE UP (ref 135–145)
GAS PNL BLDV: 136 MMOL/L — SIGNIFICANT CHANGE UP (ref 135–145)
GAS PNL BLDV: SIGNIFICANT CHANGE UP
GLUCOSE BLDC GLUCOMTR-MCNC: 161 MG/DL — HIGH (ref 70–99)
GLUCOSE BLDC GLUCOMTR-MCNC: 167 MG/DL — HIGH (ref 70–99)
GLUCOSE BLDC GLUCOMTR-MCNC: 172 MG/DL — HIGH (ref 70–99)
GLUCOSE BLDC GLUCOMTR-MCNC: 193 MG/DL — HIGH (ref 70–99)
GLUCOSE BLDC GLUCOMTR-MCNC: 215 MG/DL — HIGH (ref 70–99)
GLUCOSE BLDC GLUCOMTR-MCNC: 218 MG/DL — HIGH (ref 70–99)
GLUCOSE BLDC GLUCOMTR-MCNC: 249 MG/DL — HIGH (ref 70–99)
GLUCOSE BLDC GLUCOMTR-MCNC: 256 MG/DL — HIGH (ref 70–99)
GLUCOSE BLDV-MCNC: 176 MG/DL — HIGH (ref 70–99)
GLUCOSE BLDV-MCNC: 216 MG/DL — HIGH (ref 70–99)
GLUCOSE BLDV-MCNC: 278 MG/DL — HIGH (ref 70–99)
GLUCOSE BLDV-MCNC: 345 MG/DL — HIGH (ref 70–99)
GLUCOSE SERPL-MCNC: 183 MG/DL — HIGH (ref 70–99)
GLUCOSE SERPL-MCNC: 222 MG/DL — HIGH (ref 70–99)
GLUCOSE SERPL-MCNC: 295 MG/DL — HIGH (ref 70–99)
GLUCOSE SERPL-MCNC: 338 MG/DL — HIGH (ref 70–99)
GLUCOSE UR QL: ABNORMAL
HBA1C BLD-MCNC: 7 % — HIGH (ref 4–5.6)
HCO3 BLDV-SCNC: 15 MMOL/L — LOW (ref 21–29)
HCO3 BLDV-SCNC: 15 MMOL/L — LOW (ref 21–29)
HCO3 BLDV-SCNC: 16 MMOL/L — LOW (ref 21–29)
HCO3 BLDV-SCNC: 17 MMOL/L — LOW (ref 21–29)
HCT VFR BLD CALC: 30.4 % — LOW (ref 34.5–45)
HCT VFR BLD CALC: 39.1 % — SIGNIFICANT CHANGE UP (ref 34.5–45)
HCT VFR BLDA CALC: 31 % — LOW (ref 39–50)
HCT VFR BLDA CALC: 33 % — LOW (ref 39–50)
HCT VFR BLDA CALC: 39 % — SIGNIFICANT CHANGE UP (ref 39–50)
HCT VFR BLDA CALC: 41 % — SIGNIFICANT CHANGE UP (ref 39–50)
HDLC SERPL-MCNC: 57 MG/DL — SIGNIFICANT CHANGE UP
HGB BLD CALC-MCNC: 10.1 G/DL — LOW (ref 11.5–15.5)
HGB BLD CALC-MCNC: 10.7 G/DL — LOW (ref 11.5–15.5)
HGB BLD CALC-MCNC: 12.8 G/DL — SIGNIFICANT CHANGE UP (ref 11.5–15.5)
HGB BLD CALC-MCNC: 13.5 G/DL — SIGNIFICANT CHANGE UP (ref 11.5–15.5)
HGB BLD-MCNC: 10.9 G/DL — LOW (ref 11.5–15.5)
HGB BLD-MCNC: 13.5 G/DL — SIGNIFICANT CHANGE UP (ref 11.5–15.5)
HOROWITZ INDEX BLDV+IHG-RTO: 21 — SIGNIFICANT CHANGE UP
HYALINE CASTS # UR AUTO: 1 /LPF — SIGNIFICANT CHANGE UP (ref 0–2)
KETONES UR-MCNC: ABNORMAL
LACTATE BLDV-MCNC: 0.6 MMOL/L — LOW (ref 0.7–2)
LACTATE BLDV-MCNC: 1.4 MMOL/L — SIGNIFICANT CHANGE UP (ref 0.7–2)
LACTATE BLDV-MCNC: 1.5 MMOL/L — SIGNIFICANT CHANGE UP (ref 0.7–2)
LACTATE BLDV-MCNC: 2.5 MMOL/L — HIGH (ref 0.7–2)
LEUKOCYTE ESTERASE UR-ACNC: NEGATIVE — SIGNIFICANT CHANGE UP
LIDOCAIN IGE QN: 13 U/L — SIGNIFICANT CHANGE UP (ref 7–60)
LIPID PNL WITH DIRECT LDL SERPL: 80 MG/DL — SIGNIFICANT CHANGE UP
LYMPHOCYTES # BLD AUTO: 1.1 K/UL — SIGNIFICANT CHANGE UP (ref 1–3.3)
LYMPHOCYTES # BLD AUTO: 16.7 % — SIGNIFICANT CHANGE UP (ref 13–44)
MAGNESIUM SERPL-MCNC: 2.3 MG/DL — SIGNIFICANT CHANGE UP (ref 1.6–2.6)
MAGNESIUM SERPL-MCNC: 2.3 MG/DL — SIGNIFICANT CHANGE UP (ref 1.6–2.6)
MCHC RBC-ENTMCNC: 29.9 PG — SIGNIFICANT CHANGE UP (ref 27–34)
MCHC RBC-ENTMCNC: 30.9 PG — SIGNIFICANT CHANGE UP (ref 27–34)
MCHC RBC-ENTMCNC: 34.5 GM/DL — SIGNIFICANT CHANGE UP (ref 32–36)
MCHC RBC-ENTMCNC: 35.8 GM/DL — SIGNIFICANT CHANGE UP (ref 32–36)
MCV RBC AUTO: 86.4 FL — SIGNIFICANT CHANGE UP (ref 80–100)
MCV RBC AUTO: 86.9 FL — SIGNIFICANT CHANGE UP (ref 80–100)
MONOCYTES # BLD AUTO: 0.2 K/UL — SIGNIFICANT CHANGE UP (ref 0–0.9)
MONOCYTES NFR BLD AUTO: 3.8 % — SIGNIFICANT CHANGE UP (ref 2–14)
NEUTROPHILS # BLD AUTO: 4.9 K/UL — SIGNIFICANT CHANGE UP (ref 1.8–7.4)
NEUTROPHILS NFR BLD AUTO: 77 % — SIGNIFICANT CHANGE UP (ref 43–77)
NITRITE UR-MCNC: NEGATIVE — SIGNIFICANT CHANGE UP
OTHER CELLS CSF MANUAL: 13 ML/DL — LOW (ref 18–22)
OTHER CELLS CSF MANUAL: 8 ML/DL — LOW (ref 18–22)
PCO2 BLDV: 30 MMHG — LOW (ref 35–50)
PCO2 BLDV: 31 MMHG — LOW (ref 35–50)
PCO2 BLDV: 36 MMHG — SIGNIFICANT CHANGE UP (ref 35–50)
PCO2 BLDV: 40 MMHG — SIGNIFICANT CHANGE UP (ref 35–50)
PH BLDV: 7.18 — CRITICAL LOW (ref 7.35–7.45)
PH BLDV: 7.31 — LOW (ref 7.35–7.45)
PH BLDV: 7.32 — LOW (ref 7.35–7.45)
PH BLDV: 7.33 — LOW (ref 7.35–7.45)
PH UR: 5.5 — SIGNIFICANT CHANGE UP (ref 5–8)
PHOSPHATE SERPL-MCNC: 2.1 MG/DL — LOW (ref 2.5–4.5)
PHOSPHATE SERPL-MCNC: 4.1 MG/DL — SIGNIFICANT CHANGE UP (ref 2.5–4.5)
PLAT MORPH BLD: NORMAL — SIGNIFICANT CHANGE UP
PLATELET # BLD AUTO: 212 K/UL — SIGNIFICANT CHANGE UP (ref 150–400)
PLATELET # BLD AUTO: 219 K/UL — SIGNIFICANT CHANGE UP (ref 150–400)
PO2 BLDV: 30 MMHG — SIGNIFICANT CHANGE UP (ref 25–45)
PO2 BLDV: 34 MMHG — SIGNIFICANT CHANGE UP (ref 25–45)
PO2 BLDV: 40 MMHG — SIGNIFICANT CHANGE UP (ref 25–45)
PO2 BLDV: 80 MMHG — HIGH (ref 25–45)
POTASSIUM BLDV-SCNC: 4 MMOL/L — SIGNIFICANT CHANGE UP (ref 3.5–5.3)
POTASSIUM BLDV-SCNC: 4 MMOL/L — SIGNIFICANT CHANGE UP (ref 3.5–5.3)
POTASSIUM BLDV-SCNC: 4.4 MMOL/L — SIGNIFICANT CHANGE UP (ref 3.5–5.3)
POTASSIUM BLDV-SCNC: 4.8 MMOL/L — SIGNIFICANT CHANGE UP (ref 3.5–5.3)
POTASSIUM SERPL-MCNC: 4.2 MMOL/L — SIGNIFICANT CHANGE UP (ref 3.5–5.3)
POTASSIUM SERPL-MCNC: 4.2 MMOL/L — SIGNIFICANT CHANGE UP (ref 3.5–5.3)
POTASSIUM SERPL-MCNC: 4.7 MMOL/L — SIGNIFICANT CHANGE UP (ref 3.5–5.3)
POTASSIUM SERPL-MCNC: 4.8 MMOL/L — SIGNIFICANT CHANGE UP (ref 3.5–5.3)
POTASSIUM SERPL-SCNC: 4.2 MMOL/L — SIGNIFICANT CHANGE UP (ref 3.5–5.3)
POTASSIUM SERPL-SCNC: 4.2 MMOL/L — SIGNIFICANT CHANGE UP (ref 3.5–5.3)
POTASSIUM SERPL-SCNC: 4.7 MMOL/L — SIGNIFICANT CHANGE UP (ref 3.5–5.3)
POTASSIUM SERPL-SCNC: 4.8 MMOL/L — SIGNIFICANT CHANGE UP (ref 3.5–5.3)
PROCALCITONIN SERPL-MCNC: 0.22 NG/ML — HIGH (ref 0.02–0.1)
PROT SERPL-MCNC: 5.2 G/DL — LOW (ref 6–8.3)
PROT SERPL-MCNC: 5.8 G/DL — LOW (ref 6–8.3)
PROT SERPL-MCNC: 7.5 G/DL — SIGNIFICANT CHANGE UP (ref 6–8.3)
PROT UR-MCNC: SIGNIFICANT CHANGE UP
RAPID RVP RESULT: SIGNIFICANT CHANGE UP
RBC # BLD: 3.51 M/UL — LOW (ref 3.8–5.2)
RBC # BLD: 4.5 M/UL — SIGNIFICANT CHANGE UP (ref 3.8–5.2)
RBC # FLD: 10.7 % — SIGNIFICANT CHANGE UP (ref 10.3–14.5)
RBC # FLD: 10.7 % — SIGNIFICANT CHANGE UP (ref 10.3–14.5)
RBC BLD AUTO: SIGNIFICANT CHANGE UP
RBC CASTS # UR COMP ASSIST: 5 /HPF — HIGH (ref 0–4)
SAO2 % BLDV: 38 % — LOW (ref 67–88)
SAO2 % BLDV: 56 % — LOW (ref 67–88)
SAO2 % BLDV: 67 % — SIGNIFICANT CHANGE UP (ref 67–88)
SAO2 % BLDV: 95 % — HIGH (ref 67–88)
SODIUM SERPL-SCNC: 134 MMOL/L — LOW (ref 135–145)
SODIUM SERPL-SCNC: 135 MMOL/L — SIGNIFICANT CHANGE UP (ref 135–145)
SODIUM SERPL-SCNC: 136 MMOL/L — SIGNIFICANT CHANGE UP (ref 135–145)
SODIUM SERPL-SCNC: 136 MMOL/L — SIGNIFICANT CHANGE UP (ref 135–145)
SP GR SPEC: 1.03 — HIGH (ref 1.01–1.02)
TOTAL CHOLESTEROL/HDL RATIO MEASUREMENT: 2.6 RATIO — LOW (ref 3.3–7.1)
TRIGL SERPL-MCNC: 50 MG/DL — SIGNIFICANT CHANGE UP (ref 10–149)
TROPONIN T, HIGH SENSITIVITY RESULT: 8 NG/L — SIGNIFICANT CHANGE UP (ref 0–51)
TROPONIN T, HIGH SENSITIVITY RESULT: 8 NG/L — SIGNIFICANT CHANGE UP (ref 0–51)
TSH SERPL-MCNC: 0.76 UIU/ML — SIGNIFICANT CHANGE UP (ref 0.27–4.2)
UROBILINOGEN FLD QL: NEGATIVE — SIGNIFICANT CHANGE UP
WBC # BLD: 6.3 K/UL — SIGNIFICANT CHANGE UP (ref 3.8–10.5)
WBC # BLD: 7.7 K/UL — SIGNIFICANT CHANGE UP (ref 3.8–10.5)
WBC # FLD AUTO: 6.3 K/UL — SIGNIFICANT CHANGE UP (ref 3.8–10.5)
WBC # FLD AUTO: 7.7 K/UL — SIGNIFICANT CHANGE UP (ref 3.8–10.5)
WBC UR QL: 1 /HPF — SIGNIFICANT CHANGE UP (ref 0–5)

## 2019-09-17 PROCEDURE — 99223 1ST HOSP IP/OBS HIGH 75: CPT

## 2019-09-17 PROCEDURE — 99223 1ST HOSP IP/OBS HIGH 75: CPT | Mod: GC

## 2019-09-17 PROCEDURE — 71046 X-RAY EXAM CHEST 2 VIEWS: CPT | Mod: 26

## 2019-09-17 PROCEDURE — 99291 CRITICAL CARE FIRST HOUR: CPT

## 2019-09-17 PROCEDURE — 93010 ELECTROCARDIOGRAM REPORT: CPT

## 2019-09-17 RX ORDER — INSULIN HUMAN 100 [IU]/ML
0.5 INJECTION, SOLUTION SUBCUTANEOUS
Qty: 100 | Refills: 0 | Status: DISCONTINUED | OUTPATIENT
Start: 2019-09-17 | End: 2019-09-17

## 2019-09-17 RX ORDER — SODIUM CHLORIDE 9 MG/ML
2000 INJECTION INTRAMUSCULAR; INTRAVENOUS; SUBCUTANEOUS ONCE
Refills: 0 | Status: COMPLETED | OUTPATIENT
Start: 2019-09-17 | End: 2019-09-17

## 2019-09-17 RX ORDER — ACETAMINOPHEN 500 MG
650 TABLET ORAL ONCE
Refills: 0 | Status: COMPLETED | OUTPATIENT
Start: 2019-09-17 | End: 2019-09-17

## 2019-09-17 RX ORDER — INSULIN LISPRO 100/ML
VIAL (ML) SUBCUTANEOUS
Refills: 0 | Status: DISCONTINUED | OUTPATIENT
Start: 2019-09-17 | End: 2019-09-18

## 2019-09-17 RX ORDER — SODIUM CHLORIDE 9 MG/ML
1000 INJECTION INTRAMUSCULAR; INTRAVENOUS; SUBCUTANEOUS
Refills: 0 | Status: DISCONTINUED | OUTPATIENT
Start: 2019-09-17 | End: 2019-09-17

## 2019-09-17 RX ORDER — POTASSIUM CHLORIDE 20 MEQ
10 PACKET (EA) ORAL
Refills: 0 | Status: DISCONTINUED | OUTPATIENT
Start: 2019-09-17 | End: 2019-09-18

## 2019-09-17 RX ORDER — ONDANSETRON 8 MG/1
4 TABLET, FILM COATED ORAL ONCE
Refills: 0 | Status: COMPLETED | OUTPATIENT
Start: 2019-09-17 | End: 2019-09-17

## 2019-09-17 RX ORDER — SODIUM CHLORIDE 9 MG/ML
1000 INJECTION, SOLUTION INTRAVENOUS
Refills: 0 | Status: DISCONTINUED | OUTPATIENT
Start: 2019-09-17 | End: 2019-09-17

## 2019-09-17 RX ORDER — POTASSIUM CHLORIDE 20 MEQ
40 PACKET (EA) ORAL EVERY 4 HOURS
Refills: 0 | Status: DISCONTINUED | OUTPATIENT
Start: 2019-09-17 | End: 2019-09-17

## 2019-09-17 RX ORDER — INSULIN LISPRO 100/ML
4 VIAL (ML) SUBCUTANEOUS
Refills: 0 | Status: DISCONTINUED | OUTPATIENT
Start: 2019-09-17 | End: 2019-09-18

## 2019-09-17 RX ORDER — SODIUM CHLORIDE 9 MG/ML
1000 INJECTION, SOLUTION INTRAVENOUS ONCE
Refills: 0 | Status: COMPLETED | OUTPATIENT
Start: 2019-09-17 | End: 2019-09-17

## 2019-09-17 RX ORDER — HEPARIN SODIUM 5000 [USP'U]/ML
5000 INJECTION INTRAVENOUS; SUBCUTANEOUS EVERY 8 HOURS
Refills: 0 | Status: DISCONTINUED | OUTPATIENT
Start: 2019-09-17 | End: 2019-09-18

## 2019-09-17 RX ORDER — INSULIN LISPRO 100/ML
3 VIAL (ML) SUBCUTANEOUS
Refills: 0 | Status: DISCONTINUED | OUTPATIENT
Start: 2019-09-17 | End: 2019-09-18

## 2019-09-17 RX ORDER — INSULIN HUMAN 100 [IU]/ML
6 INJECTION, SOLUTION SUBCUTANEOUS ONCE
Refills: 0 | Status: COMPLETED | OUTPATIENT
Start: 2019-09-17 | End: 2019-09-17

## 2019-09-17 RX ORDER — INSULIN HUMAN 100 [IU]/ML
6 INJECTION, SOLUTION SUBCUTANEOUS
Qty: 100 | Refills: 0 | Status: DISCONTINUED | OUTPATIENT
Start: 2019-09-17 | End: 2019-09-17

## 2019-09-17 RX ORDER — MAGNESIUM SULFATE 500 MG/ML
2 VIAL (ML) INJECTION ONCE
Refills: 0 | Status: COMPLETED | OUTPATIENT
Start: 2019-09-17 | End: 2019-09-17

## 2019-09-17 RX ORDER — INFLUENZA VIRUS VACCINE 15; 15; 15; 15 UG/.5ML; UG/.5ML; UG/.5ML; UG/.5ML
0.5 SUSPENSION INTRAMUSCULAR ONCE
Refills: 0 | Status: COMPLETED | OUTPATIENT
Start: 2019-09-17 | End: 2019-09-18

## 2019-09-17 RX ORDER — INSULIN GLARGINE 100 [IU]/ML
15 INJECTION, SOLUTION SUBCUTANEOUS ONCE
Refills: 0 | Status: COMPLETED | OUTPATIENT
Start: 2019-09-17 | End: 2019-09-17

## 2019-09-17 RX ADMIN — SODIUM CHLORIDE 100 MILLILITER(S): 9 INJECTION, SOLUTION INTRAVENOUS at 09:09

## 2019-09-17 RX ADMIN — SODIUM CHLORIDE 1000 MILLILITER(S): 9 INJECTION, SOLUTION INTRAVENOUS at 11:55

## 2019-09-17 RX ADMIN — Medication 85 MILLIMOLE(S): at 16:52

## 2019-09-17 RX ADMIN — HEPARIN SODIUM 5000 UNIT(S): 5000 INJECTION INTRAVENOUS; SUBCUTANEOUS at 22:24

## 2019-09-17 RX ADMIN — INSULIN HUMAN 6 UNIT(S): 100 INJECTION, SOLUTION SUBCUTANEOUS at 09:21

## 2019-09-17 RX ADMIN — Medication 50 GRAM(S): at 11:55

## 2019-09-17 RX ADMIN — HEPARIN SODIUM 5000 UNIT(S): 5000 INJECTION INTRAVENOUS; SUBCUTANEOUS at 16:07

## 2019-09-17 RX ADMIN — INSULIN HUMAN 6 UNIT(S)/HR: 100 INJECTION, SOLUTION SUBCUTANEOUS at 10:28

## 2019-09-17 RX ADMIN — Medication 650 MILLIGRAM(S): at 17:07

## 2019-09-17 RX ADMIN — INSULIN GLARGINE 15 UNIT(S): 100 INJECTION, SOLUTION SUBCUTANEOUS at 17:38

## 2019-09-17 RX ADMIN — SODIUM CHLORIDE 2000 MILLILITER(S): 9 INJECTION INTRAMUSCULAR; INTRAVENOUS; SUBCUTANEOUS at 07:05

## 2019-09-17 RX ADMIN — Medication 650 MILLIGRAM(S): at 16:07

## 2019-09-17 RX ADMIN — Medication 2: at 22:24

## 2019-09-17 RX ADMIN — ONDANSETRON 4 MILLIGRAM(S): 8 TABLET, FILM COATED ORAL at 09:04

## 2019-09-17 NOTE — ED PROVIDER NOTE - SEVERE SEPSIS ALERT DETAILS
This patient was evaluated for sepsis.  At this time, a diagnosis of sepsis is not supported by the overall clinical picture

## 2019-09-17 NOTE — CONSULT NOTE ADULT - PROBLEM SELECTOR RECOMMENDATION 9
-recheck BMP to ensure that anion gap has closed and that her acidosis has resolved.  -Would transition off the gtt by overlapping for one hour with Lantus 15 units sq x 1.  -Humalog 3/4/4 units sq with B/L/D  -Humalog small scale tid-ac/qhs  DISPO: resume insulin pump  F/u with Dr. Urban 12/18/19 at 145pm at [address] [facility] [address], [place] [address]

## 2019-09-17 NOTE — CONSULT NOTE ADULT - ASSESSMENT
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

## 2019-09-17 NOTE — ED PROVIDER NOTE - CLINICAL SUMMARY MEDICAL DECISION MAKING FREE TEXT BOX
38F T1DM p/w hyperglycemia, possibly in DKA 2/2 infx such as UTI. Also p/w cp, palpitations, TWI V1-V3 on EKG. Will send trops as well as DKA workup

## 2019-09-17 NOTE — ED PROVIDER NOTE - NS ED ROS FT
REVIEW OF SYSTEMS:  General:  no fever, no chills  HEENT: no headache, no vision changes  Cardiac: +chest pain, +palpitations  Respiratory: no cough, no shortness of breath  Gastrointestinal: +abdominal pain, no nausea, no vomiting, no diarrhea  Genitourinary: no hematuria, +foul smelling urine, +urinary frequency, no urinary hesitancy   Extremities: no extremity swelling, no extremity pain  Neuro: no focal weakness, no numbness/tingling of the extremities, no decreased sensation  Heme: no easy bleeding, no easy bruising, no anemia  Skin: no abrasions, no jaundice, no pruritis, no rashes, no lesions  -Boaz Wilde, PGY-2

## 2019-09-17 NOTE — CHART NOTE - NSCHARTNOTEFT_GEN_A_CORE
MICU Transfer Note    Transfer from: MICU    Transfer to: ( x ) Medicine    (  ) Telemetry     (   ) RCU        (    ) Palliative         (   ) Stroke Unit          (   ) __________________    Accepting physican:      MICU COURSE:  37 y/o f with PMH of DM1 on insulin pump admitted with DKA and (+) chest  pain. admitted to the MICU for Insulin infusion now with Transition to Lantus 15 units after AG closed.            For Followup:  Please f/u with CE   Please f/u with endocrine / pts  for resumption of insulin pump   Please f/u with infectious work up         Vital Signs Last 24 Hrs  T(C): 36.7 (17 Sep 2019 16:00), Max: 36.9 (17 Sep 2019 11:00)  T(F): 98.1 (17 Sep 2019 16:00), Max: 98.4 (17 Sep 2019 11:00)  HR: 72 (17 Sep 2019 16:00) (72 - 102)  BP: 96/60 (17 Sep 2019 16:00) (88/53 - 108/76)  BP(mean): 73 (17 Sep 2019 16:00) (66 - 75)  RR: 18 (17 Sep 2019 16:00) (18 - 29)  SpO2: 98% (17 Sep 2019 16:00) (98% - 100%)  I&O's Summary    17 Sep 2019 07:01  -  17 Sep 2019 16:29  --------------------------------------------------------  IN: 1915.5 mL / OUT: 550 mL / NET: 1365.5 mL        MEDICATIONS  (STANDING):  heparin  Injectable 5000 Unit(s) SubCutaneous every 8 hours  influenza   Vaccine 0.5 milliLiter(s) IntraMuscular once  insulin glargine Injectable (LANTUS) 15 Unit(s) SubCutaneous once  insulin lispro (HumaLOG) corrective regimen sliding scale   SubCutaneous Before meals and at bedtime  insulin lispro Injectable (HumaLOG) 3 Unit(s) SubCutaneous before breakfast  insulin lispro Injectable (HumaLOG) 4 Unit(s) SubCutaneous before dinner  insulin lispro Injectable (HumaLOG) 4 Unit(s) SubCutaneous before lunch  insulin regular Infusion 0.5 Unit(s)/Hr (0.5 mL/Hr) IV Continuous <Continuous>  potassium chloride  10 mEq/100 mL IVPB 10 milliEquivalent(s) IV Intermittent every 1 hour  sodium chloride 0.9%. 1000 milliLiter(s) (100 mL/Hr) IV Continuous <Continuous>  sodium phosphate IVPB 30 milliMole(s) IV Intermittent once    MEDICATIONS  (PRN):        LABS                                            10.9                  Neurophils% (auto):   x      (09-17 @ 14:48):    7.7  )-----------(212          Lymphocytes% (auto):  x                                             30.4                   Eosinphils% (auto):   x        Manual%: Neutrophils x    ; Lymphocytes x    ; Eosinophils x    ; Bands%: x    ; Blasts x                                    136    |  108    |  16                  Calcium: 7.9   / iCa: x      (09-17 @ 14:49)    ----------------------------<  183       Magnesium: 2.3                              4.2     |  17     |  0.65             Phosphorous: 2.1      TPro  5.8    /  Alb  3.8    /  TBili  0.4    /  DBili  x      /  AST  13     /  ALT  8      /  AlkPhos  47     17 Sep 2019 14:49        Karina Pastrana [facility]- BC ex [phone]

## 2019-09-17 NOTE — ED PROVIDER NOTE - ATTENDING CONTRIBUTION TO CARE
37yo female pmh type 1 DM p/w "high" sugar at home, abdominal cramping, chest pains, tachypnea with paresthesias of hand, polyuria with foul smelling urine since last night. Nausea. Denies vomiting, diarrhea, rash, fever. EKG non ischemic. Possible UTI, DKA. Labs with beta hydroxy, VBG. IVF. PO hydration. Zofran. Urine preg

## 2019-09-17 NOTE — ED ADULT NURSE REASSESSMENT NOTE - NS ED NURSE REASSESS COMMENT FT1
Recvd pt awake, alert and responsive to all stimuli.  no sob or respiratory distress noted.  IVF's currently infusing, per md's orders.  vss.  pt awaiting md reeval and dispo with significant other at bedside.  will continue to monitor.

## 2019-09-17 NOTE — CONSULT NOTE ADULT - SUBJECTIVE AND OBJECTIVE BOX
HPI: 39 yo YRN x 9 years with no known complications here with a day of uncontrolled bs found to be in DKA. Yesterday her CGM read high around 7pm. She tried to bolus herself several times from her Omnipod without success. Around 1130 she took 4 units of humalog per her pen and went to sleep, Per her pump her boluses were as such: 11:11pm 1.35 units, 856pm 1.05 units, 704pm 1 units, 344pm 1 unit , 313pm 0.5 units, and 302pm 0.5 units. She did not check ketones as this was her first time ever having DKA. For dinner she avoided CHO and only ate sausage and peppers.   She saw optho 2 weeks ago and was told that her SIDDHARTH was normal. She is  - does not desire more kids, had a tubal ligation. 2 of her 3 pregnancies were while she has had diabetes.     Endocrinologist: Dr. TOD Urban    Basal Rate 12am-12am 0.65 units/hour  I:C 12am-12am 1:15  ISF 12am-12am 50  Target BS 12am-12am 125 (130)  IOB: 3 hours    Last site change: 19, POD removed this am when she had a CXR  Next site change: N/A  Insulin used: Humalog    PAST MEDICAL & SURGICAL HISTORY:  DM (diabetes mellitus): type 1  S/P Tonsillectomy  C Section: ,       FAMILY HISTORY:  Denies DM, Fibromyalgia: mother, Nephew: eczema      Social History:  Tobacco: denies  ETOH: rare  Occupation:  for [address] [place] [address]    Outpatient Medications: Humalog per pump    MEDICATIONS  (STANDING):  dextrose 5% + sodium chloride 0.45%. 1000 milliLiter(s) (150 mL/Hr) IV Continuous <Continuous>  heparin  Injectable 5000 Unit(s) SubCutaneous every 8 hours  influenza   Vaccine 0.5 milliLiter(s) IntraMuscular once  insulin regular Infusion 2 Unit(s)/Hr (2 mL/Hr) IV Continuous <Continuous>  potassium chloride  10 mEq/100 mL IVPB 10 milliEquivalent(s) IV Intermittent every 1 hour  sodium phosphate IVPB 30 milliMole(s) IV Intermittent once    MEDICATIONS  (PRN):      Allergies    penicillin (Hives)  shellfish (Swelling)    Review of Systems:  Constitutional: No fever/chills  Eyes: No blurry vision, diplopia  Neuro: +HA. neuropathy thi am only  Cardiovascular: +chest pain, palpitations  Respiratory: + SOB, no cough  GI: + nausea, vomiting this am   : No dysuria, hematuria  Psych: no depression, +increased anxiety  ALL OTHER SYSTEMS REVIEWED AND NEGATIVE        PHYSICAL EXAM:  VITALS: T(C): 36.7 (19 @ 07:40)  T(F): 98 (19 @ 07:40), Max: 98 (19 @ 07:40)  HR: 98 (19 @ 13:00) (89 - 102)  BP: 96/56 (19 @ 13:00) (93/53 - 108/76)  RR:  (18 - 25)  SpO2:  (99% - 100%)  Wt(kg): --  GENERAL: NAD, well-groomed, well-developed  EYES: No proptosis, no lid lag, anicteric  HEENT:  Atraumatic, Normocephalic, moist mucous membranes  THYROID: Normal size, no palpable nodules  RESPIRATORY: Clear to auscultation bilaterally; No rales, rhonchi, wheezing, or rubs  CARDIOVASCULAR: Regular rate and rhythm; No murmurs; no peripheral edema  GI: Soft, nontender, non distended, normal bowel sounds  SKIN: Dry, intact, No rashes or lesions  NEURO: sensation intact, extraocular movements intact, no tremor, normal reflexes  PSYCH: reactive affect, euthymic mood  CUSHING'S SIGNS: no striae    POCT Blood Glucose.: 167 mg/dL (19 @ 14:18)  POCT Blood Glucose.: 172 mg/dL (19 @ 12:59)  POCT Blood Glucose.: 218 mg/dL (19 @ 12:02)  POCT Blood Glucose.: 249 mg/dL (19 @ 11:12)  POCT Blood Glucose.: 259 mg/dL (19 @ 09:20)  POCT Blood Glucose.: 244 mg/dL (19 @ 08:37)  POCT Blood Glucose.: 255 mg/dL (19 @ 08:22)  POCT Blood Glucose.: 310 mg/dL (19 @ 06:03)                            13.5   6.3   )-----------( 219      ( 17 Sep 2019 07:16 )             39.1           135  |  104  |  21  ----------------------------<  295<H>  4.7   |  12<L>  |  0.71    EGFR if : 123  EGFR if non : 107    Ca    8.3<L>        Mg     1.7       Phos  2.2         TPro  7.5  /  Alb  4.8  /  TBili  0.6  /  DBili  x   /  AST  15  /  ALT  10  /  AlkPhos  73

## 2019-09-17 NOTE — H&P ADULT - ASSESSMENT
39 y/o f with PMH of DM1 on insulin pump admitted with DKA and (+) chest  pain.     Neuro:   Ambulate as tolerated   Neuro checks q 4 while in MICU     CV:   (+) chest pain with associated palpitations -   will obtain Cardiac enzymes and EKG to r/o ACS   ICU telemetry monitoring per protocol     Pulm:   No acute respiratory failure/ distress     GI:   (+) nausea and vomiting - now resolved   Maintain NPO status until DKA is resolved   then will resume diet     Endocrine:   DKA - Now on IV Insuline   BMP q 4 Mg Phos VBG and Beta-hydroxylase   will check A1C TSH level and Lipid profile   f/u with AG goal 11-2     ID:   no symptoms of Infection   will check procalcitonin/ cx's     VTE PPX   Heparin SQ 37 y/o f with PMH of DM1 on insulin pump admitted with DKA and (+) chest  pain.     Neuro:   Ambulate as tolerated   Neuro checks q 4 while in MICU     CV:   (+) chest pain with associated palpitations -   will obtain Cardiac enzymes and EKG to r/o ACS   ICU telemetry monitoring per protocol     Pulm:   No acute respiratory failure/ distress     GI:   (+) nausea and vomiting - now resolved   Maintain NPO status until DKA is resolved   then will resume diet     Endocrine:   DKA - Now on IV Insuline   BMP q 4 Mg Phos VBG and Beta-hydroxylase   will check A1C TSH level and Lipid profile   f/u with AG goal 11-2   Once AG closed then Lantus 15 with Pre meal of 4 TID pre meal with ISS   Endocrine f/u once Pump available     ID:   no symptoms of Infection   will check procalcitonin/ cx's     VTE PPX   Heparin SQ 37 y/o f with PMH of DM1 on insulin pump admitted with DKA and (+) chest  pain.     Neuro:   Ambulate as tolerated   Neuro checks q 4 while in MICU     CV:   (+) chest pain with associated palpitations -   will obtain Cardiac enzymes and EKG to r/o ACS   ICU telemetry monitoring per protocol     Pulm:   No acute respiratory failure/ distress     GI:   (+) nausea and vomiting - now resolved   Maintain NPO status until DKA is resolved   then will resume diet     Endocrine:   DKA - Now on IV Insuline   BMP q 4 Mg Phos VBG and Beta-hydroxylase   will check A1C TSH level and Lipid profile   f/u with AG goal 11-2   Once AG closed then Lantus 15 with Pre meal of 4 TID pre meal with ISS   Endocrine f/u once Pump available     ID:   no symptoms of Infection   will check procalcitonin/ cx's   Imaging if Abdominal pain persists     VTE PPX   Heparin SQ

## 2019-09-17 NOTE — ED ADULT NURSE NOTE - OBJECTIVE STATEMENT
pt hx t1 dm, has been having trouble controlling her blood sugar x1d. pt states blood sugar has been in the 300-400s. co polyuria body pains. aox3 maex4 skin intact neuro intact no cp no sob pt states she has been using her insulin pump and insulin pen at home. last bolus she gave herself was for 3units.

## 2019-09-17 NOTE — H&P ADULT - NSHPPHYSICALEXAM_GEN_ALL_CORE
General: WN/WD NAD  Neurology: A&Ox3, nonfocal, HARP x 4  Eyes: PERRLA, Gross vision intact  HEENT: Neck supple, trachea midline, No JVD, Gross hearing intact  Respiratory: CTA B/L, No wheezing, rales, rhonchi  CV: RRR, S1S2, no murmurs, rubs or gallops  Abdominal: Soft, NT, ND +BS  Extremities: No edema, + peripheral pulses  Skin: No Rashes, Hematoma, Ecchymosis    Lines/drains/airway:

## 2019-09-17 NOTE — ED PROVIDER NOTE - OBJECTIVE STATEMENT
38F PMH T1DM p/w hyperglycemia. Pt reports yesterday she was having very difficult to control sugars as high as 400. States tonight while trying to sleep she was very thirsty, urinating a lot. States she may have had foul smelling urine recent days as well. Reports abdominal cramping. Overnight also started having chest pain, palpitations and numbness of fingers. 38F PMH T1DM p/w hyperglycemia. Pt reports yesterday she was having very difficult to control sugars as high as 400. States tonight while trying to sleep she was very thirsty, urinating a lot. States she may have had foul smelling urine recent days as well. Reports abdominal cramping. Overnight also started having chest pain, palpitations and numbness of fingers. Chest pain is burning quality, midchest, radiated to bilateral arms. 38F PMH T1DM p/w hyperglycemia. Pt reports yesterday she was having very difficult to control sugars as high as 400. States tonight while trying to sleep she was very thirsty, urinating a lot. States she may have had foul smelling urine recent days as well. Reports abdominal cramping. Overnight also started having chest pain, palpitations and numbness of fingers. Chest pain is burning quality, midchest, radiated to bilateral arms.    Humalog 0.65U/hr on pump, 3-5 w/ meals

## 2019-09-17 NOTE — ED PROVIDER NOTE - PROGRESS NOTE DETAILS
Received sign out on patient pending labs. Patient noted to be in DKA. Patient has an insulin pump, results reviewed no signs of infection. Endocrine and MICU paged, Pump disconnected. Repeat blood work sent and repeat FS checked. Case discussed w Keegan, follow up labs, disconnect pump and monitor closely. TATAUJTRE Pt vomiting, given Yessy Posey, PGY-1

## 2019-09-17 NOTE — ED PROVIDER NOTE - PHYSICAL EXAMINATION
General: Well developed, well nourished  HEENT: Normocephalic and atraumatic, Trachea midline.   Cardiac: Normal S1 and S2 w/ tachcardia. No MRG.  Pulmonary: CTA bilaterally. Tachypneic   Abdominal: Soft, mild TTP suprapubic, non-distended   Neurologic: No focal sensory or motor deficits.  Musculoskeletal: No limited ROM.  Vascular: Warm and well perfused  Skin: Color appropriate for race.   Psychiatric: Appropriate mood and affect. No apparent risk to self or others.  Boaz Wilde, PGY-2

## 2019-09-17 NOTE — H&P ADULT - HISTORY OF PRESENT ILLNESS
39 y/o F T1DM presenting to the ED today with hyperglycemia. The patient states that yesterday she was having a very difficult time controlling her blood sugars even after self administered Bolus. Also, reported polydipsia polyuria with associated abdominal cramping, Chest pain, palpitations and  recent days as well. Reports abdominal cramping. Overnight also started having radiating chest pain to bilateral arms palpitations and numbness of fingers.     In the ED VS 97.6 102 /76 RR 20 spo2 100% on RA   Blood work was drawn and the AG was noted to be 24 with a blood sugar of 338 Beta hydroxybutyrate 4.6 Ph on venous Gas 7.32. the patient was given 2 liters of IV fluids and placed on an insuline gtt. MICU was consulted and the patient has now been transferred to the  MICU for further management

## 2019-09-17 NOTE — CONSULT NOTE ADULT - PROBLEM SELECTOR RECOMMENDATION 2
-patient does not have any PODS with her so will defer until her  can bring them. This can be restarted when she reaches home if need be.  -Asked RN/CDE Caleb to see the patient tomorrow to review insulin pump mgt for hyperglycemia testing

## 2019-09-17 NOTE — H&P ADULT - ATTENDING COMMENTS
1. DKA First time in this pt with type 1 DM. AG  acidosis of 24 on admission. Pt without sign of infection clinically or on laboratory data. ? insulin pump not working.  Pt feeling better after iv fluids and Insulin drip started. Follow DKA protocol. Endocrine consult

## 2019-09-17 NOTE — H&P ADULT - NSHPLABSRESULTS_GEN_ALL_CORE
13.5   6.3   )-----------( 219      ( 17 Sep 2019 07:16 )             39.1           135  |  104  |  21  ----------------------------<  295<H>  4.7   |  12<L>  |  0.71    Ca    8.3<L>      17 Sep 2019 08:35  Phos  2.2       Mg     1.7         TPro  7.5  /  Alb  4.8  /  TBili  0.6  /  DBili  x   /  AST  15  /  ALT  10  /  AlkPhos  73                Urinalysis Basic - ( 17 Sep 2019 07:28 )    Color: Light Yellow / Appearance: Clear / S.029 / pH: x  Gluc: x / Ketone: Large  / Bili: Negative / Urobili: Negative   Blood: x / Protein: Trace / Nitrite: Negative   Leuk Esterase: Negative / RBC: 5 /hpf / WBC 1 /HPF   Sq Epi: x / Non Sq Epi: 1 /hpf / Bacteria: Negative            Lactate Trend            CAPILLARY BLOOD GLUCOSE      POCT Blood Glucose.: 172 mg/dL (17 Sep 2019 12:59)

## 2019-09-17 NOTE — H&P ADULT - NSHPREVIEWOFSYSTEMS_GEN_ALL_CORE
REVIEW OF SYSTEMS:    CONSTITUTIONAL: No weakness, fevers or chills  EYES/ENT: No visual changes;  No vertigo or throat pain (+) numbness and tingeing to fingers starting to resolve   NECK: No pain or stiffness   RESPIRATORY: No cough, wheezing, hemoptysis; No shortness of breath (+) SOB with palpitations   CARDIOVASCULAR: (+) palpitations   GASTROINTESTINAL: No abdominal or epigastric pain. No nausea, vomiting, or hematemesis; - N(+)V is     GENITOURINARY: No dysuria, frequency or hematuria Polyuria/   NEUROLOGICAL: No numbness or weakness  SKIN: No itching, rashes

## 2019-09-18 ENCOUNTER — TRANSCRIPTION ENCOUNTER (OUTPATIENT)
Age: 39
End: 2019-09-18

## 2019-09-18 VITALS
OXYGEN SATURATION: 99 % | HEART RATE: 61 BPM | TEMPERATURE: 98 F | DIASTOLIC BLOOD PRESSURE: 56 MMHG | SYSTOLIC BLOOD PRESSURE: 91 MMHG | RESPIRATION RATE: 32 BRPM

## 2019-09-18 LAB
ANION GAP SERPL CALC-SCNC: 16 MMOL/L — SIGNIFICANT CHANGE UP (ref 5–17)
B-OH-BUTYR SERPL-SCNC: 2.7 MMOL/L — HIGH
BUN SERPL-MCNC: 13 MG/DL — SIGNIFICANT CHANGE UP (ref 7–23)
CALCIUM SERPL-MCNC: 7.8 MG/DL — LOW (ref 8.4–10.5)
CHLORIDE SERPL-SCNC: 109 MMOL/L — HIGH (ref 96–108)
CO2 SERPL-SCNC: 16 MMOL/L — LOW (ref 22–31)
CREAT SERPL-MCNC: 0.68 MG/DL — SIGNIFICANT CHANGE UP (ref 0.5–1.3)
CULTURE RESULTS: SIGNIFICANT CHANGE UP
GLUCOSE BLDC GLUCOMTR-MCNC: 241 MG/DL — HIGH (ref 70–99)
GLUCOSE BLDC GLUCOMTR-MCNC: 90 MG/DL — SIGNIFICANT CHANGE UP (ref 70–99)
GLUCOSE SERPL-MCNC: 91 MG/DL — SIGNIFICANT CHANGE UP (ref 70–99)
HCT VFR BLD CALC: 30.3 % — LOW (ref 34.5–45)
HGB BLD-MCNC: 10.8 G/DL — LOW (ref 11.5–15.5)
MAGNESIUM SERPL-MCNC: 2.2 MG/DL — SIGNIFICANT CHANGE UP (ref 1.6–2.6)
MCHC RBC-ENTMCNC: 30.9 PG — SIGNIFICANT CHANGE UP (ref 27–34)
MCHC RBC-ENTMCNC: 35.6 GM/DL — SIGNIFICANT CHANGE UP (ref 32–36)
MCV RBC AUTO: 86.8 FL — SIGNIFICANT CHANGE UP (ref 80–100)
PHOSPHATE SERPL-MCNC: 2.6 MG/DL — SIGNIFICANT CHANGE UP (ref 2.5–4.5)
PLATELET # BLD AUTO: 194 K/UL — SIGNIFICANT CHANGE UP (ref 150–400)
POTASSIUM SERPL-MCNC: 3.9 MMOL/L — SIGNIFICANT CHANGE UP (ref 3.5–5.3)
POTASSIUM SERPL-SCNC: 3.9 MMOL/L — SIGNIFICANT CHANGE UP (ref 3.5–5.3)
RBC # BLD: 3.49 M/UL — LOW (ref 3.8–5.2)
RBC # FLD: 10.9 % — SIGNIFICANT CHANGE UP (ref 10.3–14.5)
SODIUM SERPL-SCNC: 141 MMOL/L — SIGNIFICANT CHANGE UP (ref 135–145)
SPECIMEN SOURCE: SIGNIFICANT CHANGE UP
WBC # BLD: 5.4 K/UL — SIGNIFICANT CHANGE UP (ref 3.8–10.5)
WBC # FLD AUTO: 5.4 K/UL — SIGNIFICANT CHANGE UP (ref 3.8–10.5)

## 2019-09-18 PROCEDURE — 82550 ASSAY OF CK (CPK): CPT

## 2019-09-18 PROCEDURE — 87486 CHLMYD PNEUM DNA AMP PROBE: CPT

## 2019-09-18 PROCEDURE — 71046 X-RAY EXAM CHEST 2 VIEWS: CPT

## 2019-09-18 PROCEDURE — 85014 HEMATOCRIT: CPT

## 2019-09-18 PROCEDURE — 87581 M.PNEUMON DNA AMP PROBE: CPT

## 2019-09-18 PROCEDURE — 99232 SBSQ HOSP IP/OBS MODERATE 35: CPT | Mod: GC

## 2019-09-18 PROCEDURE — 83690 ASSAY OF LIPASE: CPT

## 2019-09-18 PROCEDURE — 80061 LIPID PANEL: CPT

## 2019-09-18 PROCEDURE — 85027 COMPLETE CBC AUTOMATED: CPT

## 2019-09-18 PROCEDURE — 99285 EMERGENCY DEPT VISIT HI MDM: CPT | Mod: 25

## 2019-09-18 PROCEDURE — 80048 BASIC METABOLIC PNL TOTAL CA: CPT

## 2019-09-18 PROCEDURE — 83605 ASSAY OF LACTIC ACID: CPT

## 2019-09-18 PROCEDURE — 96374 THER/PROPH/DIAG INJ IV PUSH: CPT

## 2019-09-18 PROCEDURE — 87633 RESP VIRUS 12-25 TARGETS: CPT

## 2019-09-18 PROCEDURE — 81001 URINALYSIS AUTO W/SCOPE: CPT

## 2019-09-18 PROCEDURE — 99233 SBSQ HOSP IP/OBS HIGH 50: CPT

## 2019-09-18 PROCEDURE — 84145 PROCALCITONIN (PCT): CPT

## 2019-09-18 PROCEDURE — 84484 ASSAY OF TROPONIN QUANT: CPT

## 2019-09-18 PROCEDURE — 82803 BLOOD GASES ANY COMBINATION: CPT

## 2019-09-18 PROCEDURE — 82947 ASSAY GLUCOSE BLOOD QUANT: CPT

## 2019-09-18 PROCEDURE — 87086 URINE CULTURE/COLONY COUNT: CPT

## 2019-09-18 PROCEDURE — 12345: CPT | Mod: NC

## 2019-09-18 PROCEDURE — 83036 HEMOGLOBIN GLYCOSYLATED A1C: CPT

## 2019-09-18 PROCEDURE — 80053 COMPREHEN METABOLIC PANEL: CPT

## 2019-09-18 PROCEDURE — 83735 ASSAY OF MAGNESIUM: CPT

## 2019-09-18 PROCEDURE — 93005 ELECTROCARDIOGRAM TRACING: CPT

## 2019-09-18 PROCEDURE — 82010 KETONE BODYS QUAN: CPT

## 2019-09-18 PROCEDURE — 87040 BLOOD CULTURE FOR BACTERIA: CPT

## 2019-09-18 PROCEDURE — 84100 ASSAY OF PHOSPHORUS: CPT

## 2019-09-18 PROCEDURE — 90686 IIV4 VACC NO PRSV 0.5 ML IM: CPT

## 2019-09-18 PROCEDURE — 82330 ASSAY OF CALCIUM: CPT

## 2019-09-18 PROCEDURE — 84132 ASSAY OF SERUM POTASSIUM: CPT

## 2019-09-18 PROCEDURE — 84443 ASSAY THYROID STIM HORMONE: CPT

## 2019-09-18 PROCEDURE — 82435 ASSAY OF BLOOD CHLORIDE: CPT

## 2019-09-18 PROCEDURE — 84295 ASSAY OF SERUM SODIUM: CPT

## 2019-09-18 PROCEDURE — G0480: CPT

## 2019-09-18 PROCEDURE — 82962 GLUCOSE BLOOD TEST: CPT

## 2019-09-18 PROCEDURE — 87798 DETECT AGENT NOS DNA AMP: CPT

## 2019-09-18 PROCEDURE — 82553 CREATINE MB FRACTION: CPT

## 2019-09-18 RX ORDER — INSULIN LISPRO 100/ML
3 VIAL (ML) SUBCUTANEOUS
Qty: 1 | Refills: 0
Start: 2019-09-18 | End: 2019-10-17

## 2019-09-18 RX ORDER — ACETAMINOPHEN 500 MG
650 TABLET ORAL EVERY 6 HOURS
Refills: 0 | Status: DISCONTINUED | OUTPATIENT
Start: 2019-09-18 | End: 2019-09-18

## 2019-09-18 RX ORDER — ENOXAPARIN SODIUM 100 MG/ML
12 INJECTION SUBCUTANEOUS
Qty: 1 | Refills: 0
Start: 2019-09-18 | End: 2019-10-17

## 2019-09-18 RX ORDER — GLUCAGON INJECTION, SOLUTION 0.5 MG/.1ML
1 INJECTION, SOLUTION SUBCUTANEOUS
Qty: 4 | Refills: 0
Start: 2019-09-18 | End: 2019-11-16

## 2019-09-18 RX ADMIN — Medication 650 MILLIGRAM(S): at 08:00

## 2019-09-18 RX ADMIN — Medication 650 MILLIGRAM(S): at 09:00

## 2019-09-18 RX ADMIN — HEPARIN SODIUM 5000 UNIT(S): 5000 INJECTION INTRAVENOUS; SUBCUTANEOUS at 06:07

## 2019-09-18 RX ADMIN — Medication 62.5 MILLIMOLE(S): at 11:00

## 2019-09-18 RX ADMIN — INFLUENZA VIRUS VACCINE 0.5 MILLILITER(S): 15; 15; 15; 15 SUSPENSION INTRAMUSCULAR at 13:59

## 2019-09-18 RX ADMIN — Medication 2: at 14:01

## 2019-09-18 RX ADMIN — Medication 4 UNIT(S): at 14:00

## 2019-09-18 RX ADMIN — Medication 3 UNIT(S): at 08:20

## 2019-09-18 NOTE — CHART NOTE - NSCHARTNOTEFT_GEN_A_CORE
MICU Transfer Note    Transfer from: MICU    Transfer to: ( x ) Medicine    (  ) Telemetry     (   ) RCU        (    ) Palliative         (   ) Stroke Unit          (   ) __________________    Accepting physician: HAY MORALES COURSE:  39 y/o F T1DM, who utilizes an Omnipod pump who presented on 9/17 in +AG DKA, , HCO3 11. The pt was s/p 1 day of uncontrolled BG levels 2/2 to her insulin pump not functioning and not 2/2 infection.  Pt insulin gtt has been off, pt being treated with Lantus given, pre-meal and HISS. Pt stated that her baseline SBP is 90 to high 80's, she remains hemodynamically stable, denies chest pain, N/V.            Vital Signs Last 24 Hrs  T(C): 36.9 (18 Sep 2019 04:00), Max: 37.2 (17 Sep 2019 20:00)  T(F): 98.4 (18 Sep 2019 04:00), Max: 98.9 (17 Sep 2019 20:00)  HR: 59 (18 Sep 2019 07:00) (55 - 98)  BP: 92/62 (18 Sep 2019 07:00) (83/53 - 118/70)  BP(mean): 73 (18 Sep 2019 07:00) (61 - 86)  RR: 15 (18 Sep 2019 07:00) (12 - 30)  SpO2: 98% (18 Sep 2019 07:00) (96% - 100%)  I&O's Summary    17 Sep 2019 07:01  -  18 Sep 2019 07:00  --------------------------------------------------------  IN: 2957.3 mL / OUT: 1600 mL / NET: 1357.3 mL        MEDICATIONS  (STANDING):  heparin  Injectable 5000 Unit(s) SubCutaneous every 8 hours  influenza   Vaccine 0.5 milliLiter(s) IntraMuscular once  insulin lispro (HumaLOG) corrective regimen sliding scale   SubCutaneous Before meals and at bedtime  insulin lispro Injectable (HumaLOG) 3 Unit(s) SubCutaneous before breakfast  insulin lispro Injectable (HumaLOG) 4 Unit(s) SubCutaneous before dinner  insulin lispro Injectable (HumaLOG) 4 Unit(s) SubCutaneous before lunch  potassium chloride  10 mEq/100 mL IVPB 10 milliEquivalent(s) IV Intermittent every 1 hour    MEDICATIONS  (PRN):        LABS                                            10.8                  Neurophils% (auto):   x      (09-18 @ 05:05):    5.4  )-----------(194          Lymphocytes% (auto):  x                                             30.3                   Eosinphils% (auto):   x        Manual%: Neutrophils x    ; Lymphocytes x    ; Eosinophils x    ; Bands%: x    ; Blasts x                                    141    |  109    |  13                  Calcium: 7.8   / iCa: x      (09-18 @ 05:05)    ----------------------------<  91        Magnesium: 2.2                              3.9     |  16     |  0.68             Phosphorous: 2.6      TPro  5.2    /  Alb  3.4    /  TBili  0.6    /  DBili  x      /  AST  12     /  ALT  7      /  AlkPhos  42     17 Sep 2019 22:17      Culture - Urine (collected 17 Sep 2019 09:14)  Source: .Urine  Final Report (18 Sep 2019 06:34):    <10,000 CFU/mL Normal Urogenital Shelly            ASSESSMENT & PLAN:     38yoF with a PMHX of IDDM who utilizes an Omnipod Insulin Pump presented 9/17 in +AG (24) DKA (), HCO3 11, 2/2 her pump not working and not 2/2 an infectious process.    Neuro: No Neurological Changes / Neurologically intact  -c/w neuro checks as per protocol    CV: Pt baseline SBP high 80's to 90, denies chest discomfort, +NSR / SB  -c/w monitoring VS as per protocol  -IVF if SBP <85      Pulm: Respiratory Status is stable on R/A  -OOB  -Incentive Spirometry   -chest PT  -O2 sat's >98% on R/A- supplemental O2 if needed    GI: Pt is tolerating a regular diabetic diet  -c/w diabetic diet    Renal: No issues with renal status  -trend I&O's    Endocrine: IDDM on an Insulin pump at home who was admitted in +AG DKA 2/2 malfunctioning insulin pod / pump  -s/p tx for DKA (Protocol) and transitioned to Lantus / Pre-meal and HISS- continue as ordered  -f/u by Endocrine  -Endocrine to decide to restart insulin pump before discharge home vs discharging pt on diabetic protocol and to restart her insulin pump after her return home  -trend BMP / Mg/ Phos q 12 H    Pt DKA has resolved, remains hemodynamically stable and stable for transfer to the medical floor.       For Followup:  Call Endocrine to follow up on patient for discharge instructions -   Follow up with Dr. Holloway

## 2019-09-18 NOTE — DISCHARGE NOTE NURSING/CASE MANAGEMENT/SOCIAL WORK - PATIENT PORTAL LINK FT
You can access the FollowMyHealth Patient Portal offered by [facility] by registering at the following website: [email or web address]. By joining [employer]’s FollowMyHealth portal, you will also be able to view your health information using other applications (apps) compatible with our system.

## 2019-09-18 NOTE — PROGRESS NOTE ADULT - SUBJECTIVE AND OBJECTIVE BOX
CHIEF COMPLAINT: DKA    Interval Events: No overnight events. Insulin gtt has been off since 9pm last night, Lantus given, on pre-meal coverage, and HISS.    HPI:  39 y/o F T1DM, who utilizes an Omnipod pump who presented on  in +AG DKA s/p 1 day of uncontrolled BG levels 2/2 to her insulin pump not functioning and not 2/2 infection. to the ED today with hyperglycemia. Pt insulin gtt has been off, pt being treated with Lantus given, pre-meal and HISS.       In the ED VS 97.6 102 /76 RR 20 spo2 100% on RA   Blood work was drawn and the AG was noted to be 24 with a blood sugar of 338 Beta hydroxybutyrate 4.6 Ph on venous Gas 7.32. the patient was given 2 liters of IV fluids and placed on an insuline gtt. MICU was consulted and the patient has now been transferred to the  MICU for further management   REVIEW OF SYSTEMS:  Constitutional: [ ] fevers [ ] chills [ ] weight loss [ ] weight gain  HEENT: [ ] dry eyes [ ] eye irritation [ ] postnasal drip [ ] nasal congestion  CV: [ ] chest pain [ ] orthopnea [ ] palpitations [ ] murmur  Resp: [ ] cough [ ] shortness of breath [ ] dyspnea [ ] wheezing [ ] sputum [ ] hemoptysis  GI: [ ] nausea [ ] vomiting [ ] diarrhea [ ] constipation [ ] abd pain [ ] dysphagia   : [ ] dysuria [ ] nocturia [ ] hematuria [ ] increased urinary frequency  Musculoskeletal: [ ] back pain [ ] myalgias [ ] arthralgias [ ] fracture  Skin: [ ] rash [ ] itch  Neurological: [ ] headache [ ] dizziness [ ] syncope [ ] weakness [ ] numbness  Psychiatric: [ ] anxiety [ ] depression  Endocrine: [ ] diabetes [ ] thyroid problem  Hematologic/Lymphatic: [ ] anemia [ ] bleeding problem  Allergic/Immunologic: [ ] itchy eyes [ ] nasal discharge [ ] hives [ ] angioedema  [ ] All other systems negative  [ ] Unable to assess ROS because ________    OBJECTIVE:  ICU Vital Signs Last 24 Hrs  T(C): 36.9 (18 Sep 2019 04:00), Max: 37.2 (17 Sep 2019 20:00)  T(F): 98.4 (18 Sep 2019 04:00), Max: 98.9 (17 Sep 2019 20:00)  HR: 55 (18 Sep 2019 06:00) (55 - 98)  BP: 83/53 (18 Sep 2019 06:00) (83/53 - 118/70)  BP(mean): 63 (18 Sep 2019 06:00) (61 - 86)  ABP: --  ABP(mean): --  RR: 12 (18 Sep 2019 06:00) (12 - 30)  SpO2: 98% (18 Sep 2019 06:00) (96% - 100%)      I & O's     @ 07:01  -   @ 06:23  --------------------------------------------------------  IN: 2957.3 mL / OUT: 1600 mL / NET: 1357.3 mL      CAPILLARY BLOOD GLUCOSE      POCT Blood Glucose.: 215 mg/dL (17 Sep 2019 22:04)      PHYSICAL EXAM:  General:   HEENT:   Lymph Nodes:  Neck:   Respiratory:   Cardiovascular:   Abdomen:   Extremities:   Skin:   Neurological:  Psychiatry:    LINES:    HOSPITAL MEDICATIONS:  MEDICATIONS  (STANDING):  heparin  Injectable 5000 Unit(s) SubCutaneous every 8 hours  influenza   Vaccine 0.5 milliLiter(s) IntraMuscular once  insulin lispro (HumaLOG) corrective regimen sliding scale   SubCutaneous Before meals and at bedtime  insulin lispro Injectable (HumaLOG) 3 Unit(s) SubCutaneous before breakfast  insulin lispro Injectable (HumaLOG) 4 Unit(s) SubCutaneous before dinner  insulin lispro Injectable (HumaLOG) 4 Unit(s) SubCutaneous before lunch  potassium chloride  10 mEq/100 mL IVPB 10 milliEquivalent(s) IV Intermittent every 1 hour    MEDICATIONS  (PRN):      LABS:                        10.8   5.4   )-----------( 194      ( 18 Sep 2019 05:05 )             30.3     Hgb Trend: 10.8<--, 10.9<--, 13.5<--  09-18    141  |  109<H>  |  13  ----------------------------<  91  3.9   |  16<L>  |  0.68    Ca    7.8<L>      18 Sep 2019 05:05  Phos  2.6       Mg     2.2         TPro  5.2<L>  /  Alb  3.4  /  TBili  0.6  /  DBili  x   /  AST  12  /  ALT  7<L>  /  AlkPhos  42      Creatinine Trend: 0.68<--, 0.81<--, 0.65<--, 0.71<--, 0.78<--    Urinalysis Basic - ( 17 Sep 2019 07:28 )    Color: Light Yellow / Appearance: Clear / S.029 / pH: x  Gluc: x / Ketone: Large  / Bili: Negative / Urobili: Negative   Blood: x / Protein: Trace / Nitrite: Negative   Leuk Esterase: Negative / RBC: 5 /hpf / WBC 1 /HPF   Sq Epi: x / Non Sq Epi: 1 /hpf / Bacteria: Negative        Venous Blood Gas:   @ 22:08  7.33/31/80/16/95  VBG Lactate: 0.6  Venous Blood Gas:   @ 14:37  7.31/36/34/17/56  VBG Lactate: 1.5  Venous Blood Gas:   @ 08:37  7.18/40/30/15/38  VBG Lactate: 1.4  Venous Blood Gas:   @ 07:16  7.32/30/40/15/67  VBG Lactate: 2.5      MICROBIOLOGY:   Rapid Respiratory Viral Panel (19 @ 14:48)    Rapid RVP Result: NotDetec: This Respiratory Panel uses polymerase chain reaction (PCR) to detect for  adenovirus; coronavirus (HKU1, NL63, 229E, OC43); human metapneumovirus  (hMPV); human enterovirus/rhinovirus (Entero/RV); influenza A; influenza  A/H1; influenza A/H3; influenza A/H1-2009; influenza B; parainfluenza  viruses 1, 2, 3, 4; respiratory syncytial virus; Mycoplasma pneumoniae;  and Chlamydophila pneumoniae.    Culture - Urine (19 @ 09:14)    Specimen Source: .Urine    Culture Results:   <10,000 CFU/mL Normal Urogenital Shelly    Urinalysis + Microscopic Examination (19 @ 07:28)    Color: Light Yellow    Glucose Qualitative, Urine: 1000 mg/dL    Blood, Urine: Moderate    Urine Appearance: Clear    Bilirubin: Negative    Urobilinogen: Negative    Specific Gravity: 1.029    Protein, Urine: Trace    pH Urine: 5.5    Leukocyte Esterase Concentration: Negative    Nitrite: Negative    Ketone - Urine: Large    Red Blood Cell - Urine: 5 /hpf    White Blood Cell - Urine: 1 /HPF    Epithelial Cells: 1 /hpf    Hyaline Casts: 1 /lpf    Bacteria: Negative    RADIOLOGY:      EKG: CHIEF COMPLAINT: DKA    Interval Events: No overnight events. Insulin gtt has been off since 9pm last night, Lantus given, on pre-meal coverage, and HISS.    HPI:  39 y/o F T1DM, who utilizes an Omnipod pump who presented on  in +AG DKA s/p 1 day of uncontrolled BG levels 2/2 to her insulin pump not functioning and not 2/2 infection.  Pt insulin gtt has been off, pt being treated with Lantus given, pre-meal and HISS.       In the ED VS 97.6 102 /76 RR 20 spo2 100% on RA   Blood work was drawn and the AG was noted to be 24 with a blood sugar of 338 Beta hydroxybutyrate 4.6 Ph on venous Gas 7.32. the patient was given 2 liters of IV fluids and placed on an insuline gtt. MICU was consulted and the patient has now been transferred to the  MICU for further management   REVIEW OF SYSTEMS:  Constitutional: [ ] fevers [ ] chills [ ] weight loss [ ] weight gain   -(x) Denies  HEENT: [ ] dry eyes [ ] eye irritation [ ] postnasal drip [ ] nasal congestion -(x) Denies  CV: [ ] chest pain [ ] orthopnea [ ] palpitations [ ] murmur -(x) Denies  Resp: [ ] cough [ ] shortness of breath [ ] dyspnea [ ] wheezing [ ] sputum [ ] hemoptysis -(x) Denies  GI: [ ] nausea [ ] vomiting [ ] diarrhea [ ] constipation [ ] abd pain [ ] dysphagia  -(x) Denies  : [ ] dysuria [ ] nocturia [ ] hematuria [ ] increased urinary frequency -(x) Denies  Musculoskeletal: [ ] back pain [ ] myalgias [ ] arthralgias [ ] fracture -(x) Denies  Skin: [ ] rash [ ] itch -(x) Denies  Neurological: [ ] headache [ ] dizziness [ ] syncope [ ] weakness [ ] numbness -(x) Denies  Psychiatric: [ ] anxiety [ ] depression -(x) Denies  Endocrine: [ ] diabetes [ ] thyroid problem -(x) Denies  Hematologic/Lymphatic: [ ] anemia [ ] bleeding problem -(x) Denies  Allergic/Immunologic: [ ] itchy eyes [ ] nasal discharge [ ] hives [ ] angioedema -(x) Denies  [x ] All other systems negative  [ ] Unable to assess ROS because ________    OBJECTIVE:  ICU Vital Signs Last 24 Hrs  T(C): 36.9 (18 Sep 2019 04:00), Max: 37.2 (17 Sep 2019 20:00)  T(F): 98.4 (18 Sep 2019 04:00), Max: 98.9 (17 Sep 2019 20:00)  HR: 55 (18 Sep 2019 06:00) (55 - 98)  BP: 83/53 (18 Sep 2019 06:00) (83/53 - 118/70)  BP(mean): 63 (18 Sep 2019 06:00) (61 - 86)  ABP: --  ABP(mean): --  RR: 12 (18 Sep 2019 06:00) (12 - 30)  SpO2: 98% (18 Sep 2019 06:00) (96% - 100%)      I & O's  -17 @ 07:01  -  09-18 @ 06:23  --------------------------------------------------------  IN: 2957.3 mL / OUT: 1600 mL / NET: 1357.3 mL      CAPILLARY BLOOD GLUCOSE  POCT Blood Glucose.: 215 mg/dL (17 Sep 2019 22:04)      PHYSICAL EXAM:  General: appears comfortable w/o any complaints  HEENT: normocephalic, sclera white, trachea midline,   Lymph Nodes: negative lymphadenopathy  Neck: supple, negative JVD  Respiratory: +eupnea, bilaterally equal and clear BS  Cardiovascular: S1S2 RRR, all pulses palp 2+/4  Abdomen: non distended, +BS x 4, non tender on palp  Extremities: negative edema  Skin: warm, dry, pink, intact  Neurological: no neurological deficits', +PERRL, +facial symmetry, speech is clear, bilaterally equal strength x all 4 extremities   Psychiatry:    LINES: [facility]    HOSPITAL MEDICATIONS:  MEDICATIONS  (STANDING):  heparin  Injectable 5000 Unit(s) SubCutaneous every 8 hours  influenza   Vaccine 0.5 milliLiter(s) IntraMuscular once  insulin lispro (HumaLOG) corrective regimen sliding scale   SubCutaneous Before meals and at bedtime  insulin lispro Injectable (HumaLOG) 3 Unit(s) SubCutaneous before breakfast  insulin lispro Injectable (HumaLOG) 4 Unit(s) SubCutaneous before dinner  insulin lispro Injectable (HumaLOG) 4 Unit(s) SubCutaneous before lunch  potassium chloride  10 mEq/100 mL IVPB 10 milliEquivalent(s) IV Intermittent every 1 hour    MEDICATIONS  (PRN):      LABS:                        10.8   5.4   )-----------( 194      ( 18 Sep 2019 05:05 )             30.3     Hgb Trend: 10.8<--, 10.9<--, 13.5<--      141  |  109<H>  |  13  ----------------------------<  91  3.9   |  16<L>  |  0.68    Ca    7.8<L>      18 Sep 2019 05:05  Phos  2.6       Mg     2.2         TPro  5.2<L>  /  Alb  3.4  /  TBili  0.6  /  DBili  x   /  AST  12  /  ALT  7<L>  /  AlkPhos  42      Creatinine Trend: 0.68<--, 0.81<--, 0.65<--, 0.71<--, 0.78<--    Urinalysis Basic - ( 17 Sep 2019 07:28 )    Color: Light Yellow / Appearance: Clear / S.029 / pH: x  Gluc: x / Ketone: Large  / Bili: Negative / Urobili: Negative   Blood: x / Protein: Trace / Nitrite: Negative   Leuk Esterase: Negative / RBC: 5 /hpf / WBC 1 /HPF   Sq Epi: x / Non Sq Epi: 1 /hpf / Bacteria: Negative        Venous Blood Gas:   @ 22:08  7.33/31/80/16/95  VBG Lactate: 0.6  Venous Blood Gas:   @ 14:37  7.31/36/34/17/56  VBG Lactate: 1.5  Venous Blood Gas:   @ 08:37  7.18/40/30/15/38  VBG Lactate: 1.4  Venous Blood Gas:   @ 07:16  7.32/30/40/15/67  VBG Lactate: 2.5      MICROBIOLOGY:   Rapid Respiratory Viral Panel (19 @ 14:48)    Rapid RVP Result: NotDete: This Respiratory Panel uses polymerase chain reaction (PCR) to detect for  adenovirus; coronavirus (HKU1, NL63, 229E, OC43); human metapneumovirus  (hMPV); human enterovirus/rhinovirus (Entero/RV); influenza A; influenza  A/H1; influenza A/H3; influenza A/H1-2009; influenza B; parainfluenza  viruses 1, 2, 3, 4; respiratory syncytial virus; Mycoplasma pneumoniae;  and Chlamydophila pneumoniae.    Culture - Urine (19 @ 09:14)    Specimen Source: .Urine    Culture Results:   <10,000 CFU/mL Normal Urogenital Shelly    Urinalysis + Microscopic Examination (19 @ 07:28)    Color: Light Yellow    Glucose Qualitative, Urine: 1000 mg/dL    Blood, Urine: Moderate    Urine Appearance: Clear    Bilirubin: Negative    Urobilinogen: Negative    Specific Gravity: 1.029    Protein, Urine: Trace    pH Urine: 5.5    Leukocyte Esterase Concentration: Negative    Nitrite: Negative    Ketone - Urine: Large    Red Blood Cell - Urine: 5 /hpf    White Blood Cell - Urine: 1 /HPF    Epithelial Cells: 1 /hpf    Hyaline Casts: 1 /lpf    Bacteria: Negative    RADIOLOGY:      EKG: CHIEF COMPLAINT: DKA    Interval Events: No overnight events. Insulin gtt has been off since 9pm last night, Lantus given, on pre-meal coverage, and HISS.    HPI:  37 y/o F T1DM, who utilizes an Omnipod pump who presented on  in +AG DKA, , HCO3 11. The pt was s/p 1 day of uncontrolled BG levels 2/2 to her insulin pump not functioning and not 2/2 infection.  Pt insulin gtt has been off, pt being treated with Lantus given, pre-meal and HISS. Pt stated that her baseline SBP is 90 to high 80's, she remains hemodynamically stable, denies chest pain, N/V.        In the ED VS 97.6 102 /76 RR 20 spo2 100% on RA   Blood work was drawn and the AG was noted to be 24 with a blood sugar of 338 Beta hydroxybutyrate 4.6 Ph on venous Gas 7.32. the patient was given 2 liters of IV fluids and placed on an insuline gtt. MICU was consulted and the patient has now been transferred to the  MICU for further management   REVIEW OF SYSTEMS:  Constitutional: [ ] fevers [ ] chills [ ] weight loss [ ] weight gain   -(x) Denies  HEENT: [ ] dry eyes [ ] eye irritation [ ] postnasal drip [ ] nasal congestion -(x) Denies  CV: [ ] chest pain [ ] orthopnea [ ] palpitations [ ] murmur -(x) Denies  Resp: [ ] cough [ ] shortness of breath [ ] dyspnea [ ] wheezing [ ] sputum [ ] hemoptysis -(x) Denies  GI: [ ] nausea [ ] vomiting [ ] diarrhea [ ] constipation [ ] abd pain [ ] dysphagia  -(x) Denies  : [ ] dysuria [ ] nocturia [ ] hematuria [ ] increased urinary frequency -(x) Denies  Musculoskeletal: [ ] back pain [ ] myalgias [ ] arthralgias [ ] fracture -(x) Denies  Skin: [ ] rash [ ] itch -(x) Denies  Neurological: [ ] headache [ ] dizziness [ ] syncope [ ] weakness [ ] numbness -(x) Denies  Psychiatric: [ ] anxiety [ ] depression -(x) Denies  Endocrine: [ ] diabetes [ ] thyroid problem -(x) Denies  Hematologic/Lymphatic: [ ] anemia [ ] bleeding problem -(x) Denies  Allergic/Immunologic: [ ] itchy eyes [ ] nasal discharge [ ] hives [ ] angioedema -(x) Denies  [x ] All other systems negative  [ ] Unable to assess ROS because ________    OBJECTIVE:  ICU Vital Signs Last 24 Hrs  T(C): 36.9 (18 Sep 2019 04:00), Max: 37.2 (17 Sep 2019 20:00)  T(F): 98.4 (18 Sep 2019 04:00), Max: 98.9 (17 Sep 2019 20:00)  HR: 55 (18 Sep 2019 06:00) (55 - 98)  BP: 83/53 (18 Sep 2019 06:00) (83/53 - 118/70)  BP(mean): 63 (18 Sep 2019 06:00) (61 - 86)  ABP: --  ABP(mean): --  RR: 12 (18 Sep 2019 06:00) (12 - 30)  SpO2: 98% (18 Sep 2019 06:00) (96% - 100%)      I & O's  -17 @ 07:01  -  09-18 @ 06:23  --------------------------------------------------------  IN: 2957.3 mL / OUT: 1600 mL / NET: 1357.3 mL      CAPILLARY BLOOD GLUCOSE  POCT Blood Glucose.: 215 mg/dL (17 Sep 2019 22:04)      PHYSICAL EXAM:  General: appears comfortable w/o any complaints  HEENT: normocephalic, sclera white, trachea midline,   Lymph Nodes: negative lymphadenopathy  Neck: supple, negative JVD  Respiratory: +eupnea, bilaterally equal and clear BS  Cardiovascular: S1S2 RRR, all pulses palp 2+/4  Abdomen: non distended, +BS x 4, non tender on palp  Extremities: negative edema  Skin: warm, dry, pink, intact  Neurological: no neurological deficits', +PERRL, +facial symmetry, speech is clear, bilaterally equal strength x all 4 extremities   Psychiatry:    LINES: [facility]    HOSPITAL MEDICATIONS:  MEDICATIONS  (STANDING):  heparin  Injectable 5000 Unit(s) SubCutaneous every 8 hours  influenza   Vaccine 0.5 milliLiter(s) IntraMuscular once  insulin lispro (HumaLOG) corrective regimen sliding scale   SubCutaneous Before meals and at bedtime  insulin lispro Injectable (HumaLOG) 3 Unit(s) SubCutaneous before breakfast  insulin lispro Injectable (HumaLOG) 4 Unit(s) SubCutaneous before dinner  insulin lispro Injectable (HumaLOG) 4 Unit(s) SubCutaneous before lunch  potassium chloride  10 mEq/100 mL IVPB 10 milliEquivalent(s) IV Intermittent every 1 hour    MEDICATIONS  (PRN):      LABS:                        10.8   5.4   )-----------( 194      ( 18 Sep 2019 05:05 )             30.3     Hgb Trend: 10.8<--, 10.9<--, 13.5<--      141  |  109<H>  |  13  ----------------------------<  91  3.9   |  16<L>  |  0.68    Ca    7.8<L>      18 Sep 2019 05:05  Phos  2.6       Mg     2.2         TPro  5.2<L>  /  Alb  3.4  /  TBili  0.6  /  DBili  x   /  AST  12  /  ALT  7<L>  /  AlkPhos  42      Creatinine Trend: 0.68<--, 0.81<--, 0.65<--, 0.71<--, 0.78<--    Urinalysis Basic - ( 17 Sep 2019 07:28 )    Color: Light Yellow / Appearance: Clear / S.029 / pH: x  Gluc: x / Ketone: Large  / Bili: Negative / Urobili: Negative   Blood: x / Protein: Trace / Nitrite: Negative   Leuk Esterase: Negative / RBC: 5 /hpf / WBC 1 /HPF   Sq Epi: x / Non Sq Epi: 1 /hpf / Bacteria: Negative        Venous Blood Gas:   @ 22:08  7.33/31/80/16/95  VBG Lactate: 0.6  Venous Blood Gas:   @ 14:37  7.31/36/34/17/56  VBG Lactate: 1.5  Venous Blood Gas:   @ 08:37  7.18/40/30/15/38  VBG Lactate: 1.4  Venous Blood Gas:   @ 07:16  7.32/30/40/15/67  VBG Lactate: 2.5      MICROBIOLOGY:   Rapid Respiratory Viral Panel (19 @ 14:48)    Rapid RVP Result: NotDetec: This Respiratory Panel uses polymerase chain reaction (PCR) to detect for  adenovirus; coronavirus (HKU1, NL63, 229E, OC43); human metapneumovirus  (hMPV); human enterovirus/rhinovirus (Entero/RV); influenza A; influenza  A/H1; influenza A/H3; influenza A/H1-2009; influenza B; parainfluenza  viruses 1, 2, 3, 4; respiratory syncytial virus; Mycoplasma pneumoniae;  and Chlamydophila pneumoniae.    Culture - Urine (19 @ 09:14)    Specimen Source: .Urine    Culture Results:   <10,000 CFU/mL Normal Urogenital Shelly    Urinalysis + Microscopic Examination (19 @ 07:28)    Color: Light Yellow    Glucose Qualitative, Urine: 1000 mg/dL    Blood, Urine: Moderate    Urine Appearance: Clear    Bilirubin: Negative    Urobilinogen: Negative    Specific Gravity: 1.029    Protein, Urine: Trace    pH Urine: 5.5    Leukocyte Esterase Concentration: Negative    Nitrite: Negative    Ketone - Urine: Large    Red Blood Cell - Urine: 5 /hpf    White Blood Cell - Urine: 1 /HPF    Epithelial Cells: 1 /hpf    Hyaline Casts: 1 /lpf    Bacteria: Negative    RADIOLOGY:      EKG: CHIEF COMPLAINT: DKA    Interval Events: No overnight events. Insulin gtt has been off since 9pm last night, Lantus given, on pre-meal coverage, and HISS.    HPI:  37 y/o F T1DM, who utilizes an Omnipod pump who presented on  in +AG DKA, , HCO3 11. The pt was s/p 1 day of uncontrolled BG levels 2/2 to her insulin pump not functioning and not 2/2 infection.  Pt insulin gtt has been off, pt being treated with Lantus given, pre-meal and HISS. Pt stated that her baseline SBP is 90 to high 80's, she remains hemodynamically stable, denies chest pain, N/V.        In the ED VS 97.6 102 /76 RR 20 spo2 100% on RA   Blood work was drawn and the AG was noted to be 24 with a blood sugar of 338 Beta hydroxybutyrate 4.6 Ph on venous Gas 7.32. the patient was given 2 liters of IV fluids and placed on an insuline gtt. MICU was consulted and the patient has now been transferred to the  MICU for further management   REVIEW OF SYSTEMS:  Constitutional: [ ] fevers [ ] chills [ ] weight loss [ ] weight gain   -(x) Denies  HEENT: [ ] dry eyes [ ] eye irritation [ ] postnasal drip [ ] nasal congestion -(x) Denies  CV: [ ] chest pain [ ] orthopnea [ ] palpitations [ ] murmur -(x) Denies  Resp: [ ] cough [ ] shortness of breath [ ] dyspnea [ ] wheezing [ ] sputum [ ] hemoptysis -(x) Denies  GI: [ ] nausea [ ] vomiting [ ] diarrhea [ ] constipation [ ] abd pain [ ] dysphagia  -(x) Denies  : [ ] dysuria [ ] nocturia [ ] hematuria [ ] increased urinary frequency -(x) Denies  Musculoskeletal: [ ] back pain [ ] myalgias [ ] arthralgias [ ] fracture -(x) Denies  Skin: [ ] rash [ ] itch -(x) Denies  Neurological: [ ] headache [ ] dizziness [ ] syncope [ ] weakness [ ] numbness -(x) Denies  Psychiatric: [ ] anxiety [ ] depression -(x) Denies  Endocrine: [ ] diabetes [ ] thyroid problem -(x) Denies  Hematologic/Lymphatic: [ ] anemia [ ] bleeding problem -(x) Denies  Allergic/Immunologic: [ ] itchy eyes [ ] nasal discharge [ ] hives [ ] angioedema -(x) Denies  [x ] All other systems negative    OBJECTIVE:  ICU Vital Signs Last 24 Hrs  T(C): 36.9 (18 Sep 2019 04:00), Max: 37.2 (17 Sep 2019 20:00)  T(F): 98.4 (18 Sep 2019 04:00), Max: 98.9 (17 Sep 2019 20:00)  HR: 55 (18 Sep 2019 06:00) (55 - 98)  BP: 83/53 (18 Sep 2019 06:00) (83/53 - 118/70)  BP(mean): 63 (18 Sep 2019 06:00) (61 - 86)  ABP: --  ABP(mean): --  RR: 12 (18 Sep 2019 06:00) (12 - 30)  SpO2: 98% (18 Sep 2019 06:00) (96% - 100%)      I & O's  - @ 07:01  -   @ 06:23  --------------------------------------------------------  IN: 2957.3 mL / OUT: 1600 mL / NET: 1357.3 mL      CAPILLARY BLOOD GLUCOSE  POCT Blood Glucose.: 215 mg/dL (17 Sep 2019 22:04)      PHYSICAL EXAM:  General: appears comfortable w/o any complaints  HEENT: normocephalic, sclera white, trachea midline,   Lymph Nodes: negative lymphadenopathy  Neck: supple, negative JVD  Respiratory: +eupnea, bilaterally equal and clear BS  Cardiovascular: S1S2 RRR, all pulses palp 2+/4  Abdomen: non distended, +BS x 4, non tender on palp  Extremities: negative edema  Skin: warm, dry, pink, intact  Neurological: no neurological deficits', +PERRL, +facial symmetry, speech is clear, bilaterally equal strength x all 4 extremities   Psychiatry:    LINES: [facility]    HOSPITAL MEDICATIONS:  MEDICATIONS  (STANDING):  heparin  Injectable 5000 Unit(s) SubCutaneous every 8 hours  influenza   Vaccine 0.5 milliLiter(s) IntraMuscular once  insulin lispro (HumaLOG) corrective regimen sliding scale   SubCutaneous Before meals and at bedtime  insulin lispro Injectable (HumaLOG) 3 Unit(s) SubCutaneous before breakfast  insulin lispro Injectable (HumaLOG) 4 Unit(s) SubCutaneous before dinner  insulin lispro Injectable (HumaLOG) 4 Unit(s) SubCutaneous before lunch  potassium chloride  10 mEq/100 mL IVPB 10 milliEquivalent(s) IV Intermittent every 1 hour    MEDICATIONS  (PRN):      LABS:                        10.8   5.4   )-----------( 194      ( 18 Sep 2019 05:05 )             30.3     Hgb Trend: 10.8<--, 10.9<--, 13.5<--      141  |  109<H>  |  13  ----------------------------<  91  3.9   |  16<L>  |  0.68    Ca    7.8<L>      18 Sep 2019 05:05  Phos  2.6       Mg     2.2         TPro  5.2<L>  /  Alb  3.4  /  TBili  0.6  /  DBili  x   /  AST  12  /  ALT  7<L>  /  AlkPhos  42      Creatinine Trend: 0.68<--, 0.81<--, 0.65<--, 0.71<--, 0.78<--    Urinalysis Basic - ( 17 Sep 2019 07:28 )    Color: Light Yellow / Appearance: Clear / S.029 / pH: x  Gluc: x / Ketone: Large  / Bili: Negative / Urobili: Negative   Blood: x / Protein: Trace / Nitrite: Negative   Leuk Esterase: Negative / RBC: 5 /hpf / WBC 1 /HPF   Sq Epi: x / Non Sq Epi: 1 /hpf / Bacteria: Negative        Venous Blood Gas:   @ 22:08  7.33/31/80/16/95  VBG Lactate: 0.6  Venous Blood Gas:   @ 14:37  7.31/36/34/17/56  VBG Lactate: 1.5  Venous Blood Gas:   @ 08:37  7.18/40/30/15/38  VBG Lactate: 1.4  Venous Blood Gas:   @ 07:16  7.32/30/40/15/67  VBG Lactate: 2.5      MICROBIOLOGY:   Rapid Respiratory Viral Panel (19 @ 14:48)    Rapid RVP Result: NotDetec: This Respiratory Panel uses polymerase chain reaction (PCR) to detect for  adenovirus; coronavirus (HKU1, NL63, 229E, OC43); human metapneumovirus  (hMPV); human enterovirus/rhinovirus (Entero/RV); influenza A; influenza  A/H1; influenza A/H3; influenza A/H1-2009; influenza B; parainfluenza  viruses 1, 2, 3, 4; respiratory syncytial virus; Mycoplasma pneumoniae;  and Chlamydophila pneumoniae.    Culture - Urine (19 @ 09:14)    Specimen Source: .Urine    Culture Results:   <10,000 CFU/mL Normal Urogenital Shelly    Urinalysis + Microscopic Examination (19 @ 07:28)    Color: Light Yellow    Glucose Qualitative, Urine: 1000 mg/dL    Blood, Urine: Moderate    Urine Appearance: Clear    Bilirubin: Negative    Urobilinogen: Negative    Specific Gravity: 1.029    Protein, Urine: Trace    pH Urine: 5.5    Leukocyte Esterase Concentration: Negative    Nitrite: Negative    Ketone - Urine: Large    Red Blood Cell - Urine: 5 /hpf    White Blood Cell - Urine: 1 /HPF    Epithelial Cells: 1 /hpf    Hyaline Casts: 1 /lpf    Bacteria: Negative    RADIOLOGY:      EKG:

## 2019-09-18 NOTE — ADVANCED PRACTICE NURSE CONSULT - ASSESSMENT
37 y/o female with PMH YRN x 9 years with no known complications who came in due to a day of uncontrolled FS found to be in DKA. Her CGM read high on the day of occurrence and tried to bolus herself several times with her Omnipod without success.  She eventually took 4 units of Humalog via insulin pen.  Pt did not check ketones as this was her first time ever having DKA.  Pt saw optho 2 weeks ago with no diabetes changes noted.  Pt transitioned off insulin and transitioned to MDI - Humalog 3/4/4 units sq with B/L/D - Humalog small scale tid-ac/qhs and Lantus 15 units.  Pt follows with Dr. TOD Urban for her diabetes.  Pt does not have insulin pump supplies but  will be bringing in later today. Pt's insulin pump settings as follows:    Basal  12am-12am - 0.65 units/hour  Total daily basal - 15.6 units     ICR  12am-12am -  1:15    ISF  12am-12am - 50    BGT  125 {130}    Insulin duration - 3 hours

## 2019-09-18 NOTE — PROGRESS NOTE ADULT - ATTENDING COMMENTS
1. DKA resolved. On Lantus insulin. Will hold off restarting insulin pump as per endocrine. pt stable for discharge home. F/U with endocrine.

## 2019-09-18 NOTE — DISCHARGE NOTE PROVIDER - NSDCCPCAREPLAN_GEN_ALL_CORE_FT
PRINCIPAL DISCHARGE DIAGNOSIS  Diagnosis: DKA (diabetic ketoacidoses)  Assessment and Plan of Treatment: You were admitted to the hospital for DKA secondary to a malfunctioning insulin pump. DKA (diabetic ketoacidosis) occurs because there is a lack of sufficient Insulin for this you were given IV Insulin and fluids which improved your symtoms. It is important that you continue to take your Insulin as prescribed and follow up with your endocrinologist.

## 2019-09-18 NOTE — PROGRESS NOTE ADULT - ASSESSMENT
37 yo w/h/o well controlled YRN x 9 years with no known complications. On Omnipod insulin pump and Dexcom CGM. Here with DKA likely due to insulin pump not delivering insulin secondary needle kink or insulin malabsorption since pt uses same insulin insertion sites often. Didn't noted lipodystrophy in areas of insertion but recommended pt to rotate insertion sites to other areas in the body (back of arms or outer thighs /abdomen). Pt reluctant to use abdomen since she sleeps on her abdomen and is afraid pump will come off. Pt received education by CDE and this provider about insulin pump trouble shooting and also need to have ketone stris and Glucagon emergency kit and insulin pens at home. Pt verbalized understanding and is able to give teach back.   Also spoke to pt about discharge plan ad to enter carb intake values into pump so endocrinologist can make insulin pump setting adjustments more accurately. Spen >40 minutes providing face to face education, reviewing insulin pump settings and history and providing team with d/c recs.

## 2019-09-18 NOTE — PROGRESS NOTE ADULT - ASSESSMENT
38yoF with a PMHX of IDDM who utilizes an Omnipod Insulin Pump presented 9/17 in +AG (24) DKA 2/2 her pump not working and not 2/2 an infectious process.      DKA First time in this pt with type 1 DM. AG  acidosis of 24 on admission. Pt without sign of infection clinically or on laboratory data. ? insulin pump not working.  Pt feeling better after iv fluids and Insulin drip started. Follow DKA protocol. Endocrine consult          39 yo YRN x 9 years with no known complications here with a day of uncontrolled bs found to be in DKA, endocrine consulted for DM mgt and insulin pump mgt. 38yoF with a PMHX of IDDM who utilizes an Omnipod Insulin Pump presented 9/17 in +AG (24) DKA (), HCO3 11, 2/2 her pump not working and not 2/2 an infectious process.    Neuro: No Neurological Changes / Neurologically intact  -c/w neuro checks as per protocol    CV: Pt baseline SBP high 80's to 90, denies chest discomfort, +NSR / SB  -c/w monitoring VS as per protocol  -IVF if SBP <85      Pulm: Respiratory Status is stable on R/A  -OOB  -Incentive Spirometry   -chest PT  -O2 sat's >98% on R/A- supplemental O2 if needed    GI: Pt is tolerating a regular diabetic diet  -c/w diabetic diet    Renal: 38yoF with a PMHX of IDDM who utilizes an Omnipod Insulin Pump presented 9/17 in +AG (24) DKA (), HCO3 11, 2/2 her pump not working and not 2/2 an infectious process.    Neuro: No Neurological Changes / Neurologically intact  -c/w neuro checks as per protocol    CV: Pt baseline SBP high 80's to 90, denies chest discomfort, +NSR / SB  -c/w monitoring VS as per protocol  -IVF if SBP <85      Pulm: Respiratory Status is stable on R/A  -OOB  -Incentive Spirometry   -chest PT  -O2 sat's >98% on R/A- supplemental O2 if needed    GI: Pt is tolerating a regular diabetic diet  -c/w diabetic diet    Renal: No issues with renal status  -trend I&O's    Endocrine: IDDM on an Insulin pump at home who was admitted in +AG DKA 2/2 malfunctioning insulin pod / pump  -s/p tx for DKA (Protocol) and transitioned to Lantus / Pre-meal and HISS- continue as ordered  -f/u by Endocrine  -Endocrine to decide to restart insulin pump before discharge home vs discharging pt on diabetic protocol and to restart her insulin pump after her return home  -trend BMP / Mg/ Phos q 12 H    Pt DKA has resolved, remains hemodynamically stable and stable for transfer to the medical floor.

## 2019-09-18 NOTE — DISCHARGE NOTE PROVIDER - CARE PROVIDER_API CALL
Lynsey Urban ()  Internal Medicine  [address] [facility], [address]  [place] [address]  Phone: [phone]  Fax: [phone]  Follow Up Time: 1-3 days

## 2019-09-18 NOTE — ADVANCED PRACTICE NURSE CONSULT - RECOMMEDATIONS
Pt given POD in case she has to reconnect prior to him coming.  The following insulin pump precautions and insulin pump backup plan by the AADE reviewed with pt with verbalized understanding of all instructions obtained via teach-back and written instructions provided to pt:       If you notice BG readings above 300mg/dL twice in 2 hours after a bolus insulin (correction) and it continues to rise, suspect a pump set failure. Stop the pump, and give the Humalog correction factor, using your insulin pen. Change the insulin pump POD and resume your basal rate. Continue to check your BG at 30 minutes and then hourly until you are sure the infusion set is working properly and the BG results stay under 300mg/dL.    In case you are not certain the pump is working correctly:     1. Contact the insulin pump company representative to troubleshoot the problems.  2. Stop the pump and disconnect the POD insertion set.  3. If you will be off the pump for more than 1-2 hours without a basal rate, you must correct with Humlog insulin or switch to a long acting insulin plan  4. Correct using Humalog pen with the insulin sensitivity factor (correction) every 3 hours.   5 - Use Humalog injections for your carbohydrate ratio with meals.    6.  Use long-acting insulin plan (Lantus) after pump stops and you will be disconnected for a prolonged amount of time.    7. When using Lantus, don’t restart your usual pump basal rate on that day. This will cause low BG.   8.  Ask us for help transitioning back to the pump when ready.     9. Be careful about high or low BG results during these changes in insulin therapy. Recheck ketones.   10. Follow the sick day ketone plan and increase drinking of un-sweetened fluids. Carry fast-acting carb choices like glucose tabs.   11 Contact the Diabetes Educator or Endocrine Doctor On-Call    12. Ask about updating your insulin back up plan at every routine visit. Check your Lantus expiration dates and keep refills refrigerated. Replace an opened pen every 28 days.

## 2019-09-18 NOTE — DISCHARGE NOTE PROVIDER - HOSPITAL COURSE
HPI:    39 y/o F T1DM presenting to the ED today with hyperglycemia. The patient states that yesterday she was having a very difficult time controlling her blood sugars even after self administered Bolus. Also, reported polydipsia polyuria with associated abdominal cramping, Chest pain, palpitations and  recent days as well. Reports abdominal cramping. Overnight also started having radiating chest pain to bilateral arms palpitations and numbness of fingers.         In the ED VS 97.6 102 /76 RR 20 spo2 100% on RA     Blood work was drawn and the AG was noted to be 24 with a blood sugar of 338 Beta hydroxybutyrate 4.6 Ph on venous Gas 7.32. the patient was given 2 liters of IV fluids and placed on an insuline gtt. MICU was consulted and the patient has now been transferred to the  MICU for further management (17 Sep 2019 12:59)            Hospital Course:    Pt was admitted to the MICU for DKA treatment HPI:    38 y/oF w a HX of IDDM who utilizes an insulin pump at home presented to the ED in +AG DKA (AG 24 /  / HCO3 11) on 9/17/19 s/p 1 day of     hyperglycemia, polydipsia, polyuria, abdominal cramping, non radiating chest discomfort, and palpitations. The pt was treated with IVF, an insulin gtt    and successfully transitioned to Lantus, pre-meal insulin, and an HISS. The patient remained hemodynamically stable, hyperglycemia (DKA), and +AG resolved.             In the ED VS 97.6 102 /76 RR 20 spo2 100% on RA     Blood work was drawn and the AG was noted to be 24 with a blood sugar of 338 Beta hydroxybutyrate 4.6 Ph on venous Gas 7.32. the patient was given 2 liters of IV fluids and placed on an insuline gtt. MICU was consulted and the patient has now been transferred to the  MICU for further management (17 Sep 2019 12:59)            Hospital Course:    Pt was admitted to the hospital in +AG DKA and tx to the MICU for DKA treatment. In the ED VS 97.6 102 /76 RR 20 spo2 100% on RA     Blood work was drawn and the AG was noted to be 24 with a blood sugar of 338 Beta hydroxybutyrate 4.6 Ph on venous Gas 7.32. the patient was given     2 liters of IV fluids and placed on an insulin gtt. MICU was consulted and the patient was transferred to the  MICU for further management (17 Sep 2019 12:59)    The pt was successfully transitioned to Lantus / pre-meal insulin and HISS last night. AG is closed, serum bicarb now normalized, and accompanying DKA     admitting symptoms fully resolved. HPI:    38 y/oF w a HX of IDDM who utilizes an insulin pump at home presented to the ED in +AG DKA (AG 24 /  / HCO3 11) on 9/17/19 s/p 1 day of     hyperglycemia, polydipsia, polyuria, abdominal cramping, non radiating chest discomfort, and palpitations. The pt was treated with IVF, an insulin gtt    and successfully transitioned to Lantus, pre-meal insulin, and an HISS. The patient remained hemodynamically stable, hyperglycemia (DKA), and +AG resolved.             Hospital Course:    Pt was admitted to the hospital in +AG DKA and tx to the MICU for DKA treatment. In the ED VS 97.6 102 /76 RR 20 spo2 100% on RA     Blood work was drawn and the AG was noted to be 24 with a blood sugar of 338 Beta hydroxybutyrate 4.6 Ph on venous Gas 7.32. the patient was given     2 liters of IV fluids and placed on an insulin gtt. MICU was consulted and the patient was transferred to the  MICU for further management (17 Sep 2019 12:59)    The pt was successfully transitioned to Lantus / pre-meal insulin and HISS last night. AG is closed, serum bicarb now normalized, and accompanying DKA     admitting symptoms fully resolved.

## 2019-09-18 NOTE — DISCHARGE NOTE PROVIDER - NSDCFUSCHEDAPPT_GEN_ALL_CORE_FT
ABDIRIZAK ORTIZ ; 09/20/2019 ; NPP Cardio Echo [address] ABDIRIZAK Cox ; 09/20/2019 ; NPP Med Endocr [address] [facility]  ABDIRIZAK ORTIZ ; 09/27/2019 ; NPP [address] [facility] ABDIRIZAK ORTIZ ; 09/20/2019 ; NPP Cardio Echo [address] ABDIRIZAK Cox ; 09/20/2019 ; NPP Med Endocr [address] [facility]  ABDIRIZAK ORTIZ ; 09/27/2019 ; NPP [address] [facility] ABDIRIZAK ORTIZ ; 09/20/2019 ; NPP Cardio Echo [address] ABDIRIZAK Cox ; 09/20/2019 ; NPP Med Endocr [address] [facility]  ABDIRIZAK ORTIZ ; 09/27/2019 ; NPP [address] [facility] ABDIRIZAK ORTIZ ; 09/20/2019 ; NPP Cardio Echo [address] ABDIRIZAK Cox ; 09/20/2019 ; NPP Med Endocr [address] [facility]  ABDIRIZAK ORTIZ ; 09/27/2019 ; NPP [address] [facility] ABDIRIZAK ORTIZ ; 09/20/2019 ; NPP Cardio Echo [address] ABDIRIZAK Cox ; 09/20/2019 ; NPP Med Endocr [address] [facility]  ABDIRIZAK ORTIZ ; 09/27/2019 ; NPP [address] [facility]

## 2019-09-18 NOTE — PROGRESS NOTE ADULT - SUBJECTIVE AND OBJECTIVE BOX
DIABETES FOLLOW UP NOTE: Saw pt earlier today  INTERVAL HX: 37 yo w/h/o well controlled YRN x 9 years with no known complications. On Omnipod insulin pump and Dexcom CGM. Pt reports 24 hours of continuos hyperglycemia that didn't improve with bolus insulin. Also reports feeling N/abdominal pain and palpitations> Came to ED and was found to be in DKA. Saw pt in MICU. Reports tolerating POs with FBG <100s fasting and BG per CGM at time of visit 245mg/dL(ac lunch). Pt presently on a basal/bolus regimen and per primary team, pt ready to go home today. Saw CDE this am and reviewed insulin pump trouble shooting when BGs do not improve with insulin bolus. Pt doesn't have ketone strips/ Glucagon emergency kit at home and no back up insulin pens as well. Pt has f/u with endo this friday Dr. TOD Urban. Reviewed insulin pump record noting that pt enters bolus doses and BG values but doesn't enter carb intake. Per interaction, pt also limits carb intake on and off. No hypoglycemia.     Review of Systems:  General: As above  Cardiovascular: No chest pain, palpitations  Respiratory: No SOB, no cough  GI: No nausea, vomiting, abdominal pain  Endocrine: no polyuria, polydipsia or S&Sx of hypoglycemia    Allergies    penicillin (Hives)  shellfish (Swelling)    Intolerances      MEDICATIONS    insulin lispro (HumaLOG) corrective regimen sliding scale   SubCutaneous Before meals and at bedtime  insulin lispro Injectable (HumaLOG) 3 Unit(s) SubCutaneous before breakfast  insulin lispro Injectable (HumaLOG) 4 Unit(s) SubCutaneous before dinner  insulin lispro Injectable (HumaLOG) 4 Unit(s) SubCutaneous before lunch    Insulin pump settings as follows: (PUMP OFF AT TIME OF VISIT)  Basal Rate 12am-12am 0.65 units/hour. tOTAL IN 24 H>15.6 UNITS  I:C 12am-12am 1:15  ISF 12am-12am 50  Target BS 12am-12am 125 (130)  IOB: 3 hours        PHYSICAL EXAM:  VITALS: T(C): 36.6 (09-18-19 @ 12:00)  T(F): 97.9 (09-18-19 @ 12:00), Max: 98.9 (09-17-19 @ 20:00)  HR: 61 (09-18-19 @ 12:00) (55 - 80)  BP: 91/56 (09-18-19 @ 12:00) (83/53 - 118/70)  RR:  (12 - 32)  SpO2:  (96% - 100%)  Wt(kg): --  GENERAL: Female laying in bed  in NAD  Abdomen: Soft, nontender, non distended  Extremities: Warm, no edema in all 4 exts  NEURO: A&O X3    LABS:  POCT Blood Glucose.: 241 mg/dL (09-18-19 @ 13:57)  POCT Blood Glucose.: 90 mg/dL (09-18-19 @ 08:18)  POCT Blood Glucose.: 215 mg/dL (09-17-19 @ 22:04)  POCT Blood Glucose.: 193 mg/dL (09-17-19 @ 17:39)  POCT Blood Glucose.: 161 mg/dL (09-17-19 @ 16:10)  POCT Blood Glucose.: 167 mg/dL (09-17-19 @ 14:18)  POCT Blood Glucose.: 172 mg/dL (09-17-19 @ 12:59)  POCT Blood Glucose.: 218 mg/dL (09-17-19 @ 12:02)  POCT Blood Glucose.: 249 mg/dL (09-17-19 @ 11:12)  POCT Blood Glucose.: 256 mg/dL (09-17-19 @ 10:33)  POCT Blood Glucose.: 259 mg/dL (09-17-19 @ 09:20)  POCT Blood Glucose.: 244 mg/dL (09-17-19 @ 08:37)  POCT Blood Glucose.: 255 mg/dL (09-17-19 @ 08:22)  POCT Blood Glucose.: 310 mg/dL (09-17-19 @ 06:03)                            10.8   5.4   )-----------( 194      ( 18 Sep 2019 05:05 )             30.3       09-18    141  |  109<H>  |  13  ----------------------------<  91  3.9   |  16<L>  |  0.68    EGFR if : 127  EGFR if non : 109    Ca    7.8<L>      09-18  Mg     2.2     09-18  Phos  2.6     09-18    TPro  5.2<L>  /  Alb  3.4  /  TBili  0.6  /  DBili  x   /  AST  12  /  ALT  7<L>  /  AlkPhos  42  09-17      Thyroid Function Tests:  09-17 @ 17:35 TSH 0.76 FreeT4 -- T3 -- Anti TPO -- Anti Thyroglobulin Ab -- TSI --      Hemoglobin A1C, Whole Blood: 7.0 % <H> [4.0 - 5.6] (09-17-19 @ 17:08)      09-17 Chol 147 LDL 80 HDL 57 Trig 50

## 2019-09-18 NOTE — PROGRESS NOTE ADULT - PROBLEM SELECTOR PLAN 1
DISCHARGE:  -Test BG ac and hs  -Discontinue ALL insulin sq orders upon discharge.  -Pt to start insulin pump at pre-admission settings at 5:30pm today since she received Lantus at this time yesterday.  Pt to use back of arms until she sees endo on Friday and enter carb values as well  -Please write Rxs for: Solo Star Insulin pen/Humalog Kwik insulin pen/Natacha insulin pen needles/glucagon emergency kit # 2/ Ketone strips # 1 box.   -Plan discussed with pt/team.  Contact info: [phone] (24/7). pager [phone] DISCHARGE:  -Test BG ac and hs  -Discontinue ALL insulin sq orders upon discharge.  -Pt to start insulin pump at pre-admission settings at 5:30pm today since she received Lantus at this time yesterday.  Pt to use back of arms until she sees endo on Friday and enter carb values as well  -Please write Rxs for: Solo Star Insulin pen/Humalog Kwik insulin pen/Natacha insulin pen needles/glucagon emergency kit # 2/ Ketone strips # 1 box. Pt needs to have back up insulin at home in case of insulin pump malfunction in order to avoid DKA  -Plan discussed with pt/team.  Contact info: [phone] (24/7). pager [phone]

## 2019-09-20 ENCOUNTER — APPOINTMENT (OUTPATIENT)
Dept: CV DIAGNOSITCS | Facility: HOSPITAL | Age: 39
End: 2019-09-20

## 2019-09-20 ENCOUNTER — APPOINTMENT (OUTPATIENT)
Dept: ENDOCRINOLOGY | Facility: CLINIC | Age: 39
End: 2019-09-20
Payer: COMMERCIAL

## 2019-09-20 ENCOUNTER — APPOINTMENT (OUTPATIENT)
Dept: ENDOCRINOLOGY | Facility: CLINIC | Age: 39
End: 2019-09-20

## 2019-09-20 VITALS
BODY MASS INDEX: 23.1 KG/M2 | DIASTOLIC BLOOD PRESSURE: 70 MMHG | SYSTOLIC BLOOD PRESSURE: 110 MMHG | OXYGEN SATURATION: 98 % | WEIGHT: 132 LBS | HEART RATE: 74 BPM | HEIGHT: 63.5 IN

## 2019-09-20 LAB
ACETONE, GCMS SCREEN URN: 29 MG/DL — SIGNIFICANT CHANGE UP
ISOPROPANOL GCMS: SIGNIFICANT CHANGE UP MG/DL

## 2019-09-20 PROCEDURE — 99214 OFFICE O/P EST MOD 30 MIN: CPT

## 2019-09-22 LAB
CULTURE RESULTS: SIGNIFICANT CHANGE UP
SPECIMEN SOURCE: SIGNIFICANT CHANGE UP

## 2019-09-25 NOTE — PHYSICAL EXAM
[Alert] : alert [No Acute Distress] : no acute distress [Well Nourished] : well nourished [Well Developed] : well developed [Normal Sclera/Conjunctiva] : normal sclera/conjunctiva [PERRL] : pupils equal, round and reactive to light [EOMI] : extra ocular movement intact [No Proptosis] : no proptosis [Normal Outer Ear/Nose] : the ears and nose were normal in appearance [Normal TMs] : both tympanic membranes were normal [Normal Hearing] : hearing was normal [No Neck Mass] : no neck mass was observed [Supple] : the neck was supple [Thyroid Not Enlarged] : the thyroid was not enlarged [No Respiratory Distress] : no respiratory distress [Normal Rate and Effort] : normal respiratory rhythm and effort [Clear to Auscultation] : lungs were clear to auscultation bilaterally [Normal Rate] : heart rate was normal  [Normal S1, S2] : normal S1 and S2 [Regular Rhythm] : with a regular rhythm [Normal Bowel Sounds] : normal bowel sounds [Soft] : abdomen soft [Not Tender] : non-tender [Not Distended] : not distended [Normal Gait] : normal gait [Normal] : normal and non tender [No Joint Swelling] : no joint swelling seen [Normal Strength/Tone] : muscle strength and tone were normal [No Clubbing, Cyanosis] : no clubbing  or cyanosis of the fingernails [No Rash] : no rash [No Skin Lesions] : no skin lesions [Normal Reflexes] : deep tendon reflexes were 2+ and symmetric [No Motor Deficits] : the motor exam was normal [No Tremors] : no tremors [Normal Insight/Judgement] : insight and judgment were intact [Oriented x3] : oriented to person, place, and time [Normal Affect] : the affect was normal [Normal Mood] : the mood was normal

## 2019-09-26 ENCOUNTER — APPOINTMENT (OUTPATIENT)
Dept: INTERNAL MEDICINE | Facility: CLINIC | Age: 39
End: 2019-09-26

## 2019-09-27 ENCOUNTER — APPOINTMENT (OUTPATIENT)
Dept: OBGYN | Facility: CLINIC | Age: 39
End: 2019-09-27
Payer: COMMERCIAL

## 2019-09-27 VITALS
SYSTOLIC BLOOD PRESSURE: 101 MMHG | BODY MASS INDEX: 22.75 KG/M2 | HEART RATE: 72 BPM | HEIGHT: 63.5 IN | TEMPERATURE: 98.1 F | OXYGEN SATURATION: 98 % | DIASTOLIC BLOOD PRESSURE: 70 MMHG | WEIGHT: 130 LBS

## 2019-09-27 DIAGNOSIS — Z01.419 ENCOUNTER FOR GYNECOLOGICAL EXAMINATION (GENERAL) (ROUTINE) W/OUT ABNORMAL FINDINGS: ICD-10-CM

## 2019-09-27 PROCEDURE — 99396 PREV VISIT EST AGE 40-64: CPT | Mod: 25

## 2019-09-27 NOTE — PHYSICAL EXAM
[Alert] : alert [Awake] : awake [Soft] : soft [Oriented x3] : oriented to person, place, and time [Normal] : uterus [Uterine Adnexae] : were not tender and not enlarged [No Bleeding] : there was no active vaginal bleeding [Acute Distress] : no acute distress [LAD] : no lymphadenopathy [Thyroid Nodule] : no thyroid nodule [Goiter] : no goiter [Mass] : no breast mass [Nipple Discharge] : no nipple discharge [Axillary LAD] : no axillary lymphadenopathy [Tender] : non tender

## 2019-09-27 NOTE — REVIEW OF SYSTEMS
[Fatigue] : no fatigue [Decreased Appetite] : appetite not decreased [Recent Weight Gain (___ Lbs)] : no recent weight gain [Recent Weight Loss (___ Lbs)] : no recent weight loss [Visual Field Defect] : no visual field defect [Blurry Vision] : no blurred vision [Dry Eyes] : no dryness of the eyes [Eyes Itch] : no itching of the eyes [Dysphagia] : no dysphagia [Dysphonia] : no dysphonia [Neck Pain] : no neck pain [Nasal Congestion] : no nasal congestion [Chest Pain] : no chest pain [Palpitations] : no palpitations [Heart Rate Is Slow] : the heart rate was not slow [Shortness Of Breath] : no shortness of breath [Heart Rate Is Fast] : the heart rate was not fast [Wheezing] : no wheezing was heard [Cough] : no cough [SOB on Exertion] : no shortness of breath during exertion [Vomiting] : no vomiting was observed [Nausea] : no nausea [Diarrhea] : no diarrhea [Constipation] : no constipation [Polyuria] : no polyuria [Joint Pain] : no joint pain [Irregular Menses] : regular menses [Joint Stiffness] : no joint stiffness [Muscle Weakness] : no muscle weakness [Acanthosis] : no acanthosis  [Muscle Cramps] : no muscle cramps [Acne] : no acne [Hirsutism] : no hirsutism [Hair Loss] : no hair loss [Headache] : no headaches [Tremors] : no tremors [Dizziness] : no dizziness [Depression] : no depression [Anxiety] : no anxiety [Polydipsia] : no polydipsia [Cold Intolerance] : cold tolerant [Galactorrhea] : no galactorrhea  [Heat Intolerance] : heat tolerant [Easy Bleeding] : no ~M tendency for easy bleeding [Swelling] : no swelling [Easy Bruising] : no tendency for easy bruising

## 2019-09-27 NOTE — HISTORY OF PRESENT ILLNESS
[FreeTextEntry1] : Diabetes Follow-up Patient HPI\par \par \par Just recently on Monday at [facility] ( DKA) and she was released in one day \par \par HPI:\par \par Duration of Diabetes: 8 years \par Is patient on Insulin? Yes, 8 years \par If yes, how long on insulin? Insulin pump for 4 years ( omnipod with G5) \par \par List Current Medications for Glycemic control and the doses:\par 1- Insulin pump ( Omnipod with DEXCOM G6) \par 2- \par 3- \par \par SMBG (self monitored blood glucose) readings: \par - Name of glucometer: \par - How often does the patient check BG? \par - Does the patient keep a log? \par \par If detailed record is available, what is the range of most of the BG readings?\par - Before Breakfast: 100-150\par - Before Lunch: 109-150\par - Before Dinner: 120-148\par - Before Bedtime: 180s\par \par \par Does patient get Hypoglycemic episodes? None at all \par \par \par Diabetic Complications: Is patient aware of having any of those complications?\par - Eyes: Retinopathy? None \par  	When was the last fully dilated eye exam? 2 weeks ago \par - Feet: 	Neuropathy? None \par  	Foot Ulcers? None \par 	When was the last time patient saw a Podiatrist? None \par - Kidneys: Nephropathy? GFr of 111, microalb/cr: 37 \par \par \par Diet: review patient's diet: \par Breakfast: peanut butter\par Lunch: Salad, turkey, bread \par Dinner: Salad\par Snacks: Random things \par Juice or soda: None \par Desserts: None \par \par \par Exercise: review patient exercise habits: \par Peloton x 3 times per week \par \par Symptoms: Write any symptoms, concerns and issues bothering the patient which have not be addressed above: \par No blurry vision, thirst or urination \par \par  \par

## 2019-09-27 NOTE — ASSESSMENT
[FreeTextEntry1] : 38 year old female with history of DM Type 1 here for follow up. \par She is currently managed on the Omnipod and DEXCOM G6. HgA1C of 7.4%. Some hyperglycemia post dinner. \par Overall, quite well controlled, will decrease dinner time carb ratio . Advised to initiate a temp basal of 50% when exercising. \par \par -Will change the carb ratio to 1:13 at 6 PM\par -Will need optho/podiatry appointments\par -SMBGs as appropriate \par -Low carb consistent diet\par \par -Follow up in 4-5 months. \par  \par

## 2019-09-28 LAB
C TRACH RRNA SPEC QL NAA+PROBE: NOT DETECTED
N GONORRHOEA RRNA SPEC QL NAA+PROBE: NOT DETECTED
SOURCE AMPLIFICATION: NORMAL

## 2019-10-01 LAB
HPV HIGH+LOW RISK DNA PNL CVX: NOT DETECTED
SOURCE AMPLIFICATION: NORMAL
T VAGINALIS RRNA SPEC QL NAA+PROBE: NOT DETECTED

## 2019-10-03 LAB — CYTOLOGY CVX/VAG DOC THIN PREP: NORMAL

## 2019-10-07 ENCOUNTER — OTHER (OUTPATIENT)
Age: 39
End: 2019-10-07

## 2019-10-07 DIAGNOSIS — M79.89 OTHER SPECIFIED SOFT TISSUE DISORDERS: ICD-10-CM

## 2019-10-18 ENCOUNTER — FORM ENCOUNTER (OUTPATIENT)
Age: 39
End: 2019-10-18

## 2019-10-18 ENCOUNTER — APPOINTMENT (OUTPATIENT)
Dept: RHEUMATOLOGY | Facility: CLINIC | Age: 39
End: 2019-10-18
Payer: COMMERCIAL

## 2019-10-18 VITALS
BODY MASS INDEX: 23.1 KG/M2 | HEART RATE: 74 BPM | HEIGHT: 63.5 IN | DIASTOLIC BLOOD PRESSURE: 68 MMHG | WEIGHT: 132 LBS | TEMPERATURE: 98.5 F | OXYGEN SATURATION: 100 % | RESPIRATION RATE: 15 BRPM | SYSTOLIC BLOOD PRESSURE: 97 MMHG

## 2019-10-18 DIAGNOSIS — M75.102 UNSPECIFIED ROTATOR CUFF TEAR OR RUPTURE OF LEFT SHOULDER, NOT SPECIFIED AS TRAUMATIC: ICD-10-CM

## 2019-10-18 DIAGNOSIS — H04.123 DRY EYE SYNDROME OF BILATERAL LACRIMAL GLANDS: ICD-10-CM

## 2019-10-18 PROCEDURE — 99203 OFFICE O/P NEW LOW 30 MIN: CPT

## 2019-10-18 PROCEDURE — 99214 OFFICE O/P EST MOD 30 MIN: CPT

## 2019-10-18 RX ORDER — POLYETHYLENE GLYCOL 3350 17 G/17G
17 POWDER, FOR SOLUTION ORAL
Qty: 238 | Refills: 0 | Status: DISCONTINUED | COMMUNITY
Start: 2018-12-27 | End: 2019-10-18

## 2019-10-19 ENCOUNTER — OUTPATIENT (OUTPATIENT)
Dept: OUTPATIENT SERVICES | Facility: HOSPITAL | Age: 39
LOS: 1 days | End: 2019-10-19
Payer: COMMERCIAL

## 2019-10-19 ENCOUNTER — APPOINTMENT (OUTPATIENT)
Dept: MRI IMAGING | Facility: CLINIC | Age: 39
End: 2019-10-19
Payer: COMMERCIAL

## 2019-10-19 DIAGNOSIS — M75.102 UNSPECIFIED ROTATOR CUFF TEAR OR RUPTURE OF LEFT SHOULDER, NOT SPECIFIED AS TRAUMATIC: ICD-10-CM

## 2019-10-19 PROCEDURE — 73221 MRI JOINT UPR EXTREM W/O DYE: CPT

## 2019-10-19 PROCEDURE — 73221 MRI JOINT UPR EXTREM W/O DYE: CPT | Mod: 26,LT

## 2019-10-21 LAB
CRP SERPL-MCNC: <0.1 MG/DL
DEPRECATED KAPPA LC FREE/LAMBDA SER: 1.04 RATIO
ERYTHROCYTE [SEDIMENTATION RATE] IN BLOOD BY WESTERGREN METHOD: 2 MM/HR
IGA SER QL IEP: 141 MG/DL
IGG SER QL IEP: 803 MG/DL
IGM SER QL IEP: 73 MG/DL
KAPPA LC CSF-MCNC: 1.74 MG/DL
KAPPA LC SERPL-MCNC: 1.81 MG/DL
RHEUMATOID FACT SER QL: <10 IU/ML
THYROGLOB AB SERPL-ACNC: <20 IU/ML
THYROPEROXIDASE AB SERPL IA-ACNC: <10 IU/ML

## 2019-10-21 NOTE — PHYSICAL EXAM
[General Appearance - Alert] : alert [General Appearance - In No Acute Distress] : in no acute distress [Neck Appearance] : the appearance of the neck was normal [Neck Cervical Mass (___cm)] : no neck mass was observed [Jugular Venous Distention Increased] : there was no jugular-venous distention [Thyroid Diffuse Enlargement] : the thyroid was not enlarged [Thyroid Nodule] : there were no palpable thyroid nodules [Heart Rate And Rhythm] : heart rate was normal and rhythm regular [Auscultation Breath Sounds / Voice Sounds] : lungs were clear to auscultation bilaterally [Heart Sounds] : normal S1 and S2 [Heart Sounds Gallop] : no gallops [Murmurs] : no murmurs [Bowel Sounds] : normal bowel sounds [Heart Sounds Pericardial Friction Rub] : no pericardial rub [Abdomen Soft] : soft [Abdomen Tenderness] : non-tender [] : no hepato-splenomegaly [Abdomen Mass (___ Cm)] : no abdominal mass palpated [Cervical Lymph Nodes Enlarged Posterior Bilaterally] : posterior cervical [Cervical Lymph Nodes Enlarged Anterior Bilaterally] : anterior cervical [No CVA Tenderness] : no ~M costovertebral angle tenderness [Supraclavicular Lymph Nodes Enlarged Bilaterally] : supraclavicular [Abnormal Walk] : normal gait [No Spinal Tenderness] : no spinal tenderness [Nail Clubbing] : no clubbing  or cyanosis of the fingernails [Motor Tone] : muscle strength and tone were normal [Musculoskeletal - Swelling] : no joint swelling seen [Oriented To Time, Place, And Person] : oriented to person, place, and time [FreeTextEntry1] : left shoulder with positive empty can sign [Impaired Insight] : insight and judgment were intact [Affect] : the affect was normal

## 2019-10-21 NOTE — ASSESSMENT
[FreeTextEntry1] : 38 year-old female with hair loss and joint pain - with positive HARINDER 1:320 speckled\par \par Add basic rheum work- up for RA and scleroderma\par \par \par severe left shoulder pain and loss of ROM with positive empty can sign - send for MRI\par \par \par f/u after MRI

## 2019-10-21 NOTE — HISTORY OF PRESENT ILLNESS
[FreeTextEntry1] : Patient with type 1 diabetes for the last 8 years - controlled with insulin.\par Developed hair loss about 2 years ago - with thinning - no bald spot\par seen by derm - positive HARINDER 1:320 - speckled - No active Raynaud;s - DsDNA and Sjogren;s were negative \par Left joint pain for about 18 months - left shoulder mostly - had PT with no improvement\par left hip and knees\par Dry eyes - wears contacts - \par \par Denies any fevers, chill, rashes, weight loss,fatigue,  joint pains, dry eyes or mouth, mouth sores, Raynaud's. chest pain or SOB, GI or , numbness/tingling\par \par \par

## 2019-10-22 LAB
ENA RNP AB SER IA-ACNC: 0.3 AL
ENA SCL70 IGG SER IA-ACNC: <0.2 AL
ENA SM AB SER IA-ACNC: <0.2 AL

## 2019-10-23 ENCOUNTER — TRANSCRIPTION ENCOUNTER (OUTPATIENT)
Age: 39
End: 2019-10-23

## 2019-10-23 ENCOUNTER — APPOINTMENT (OUTPATIENT)
Dept: ULTRASOUND IMAGING | Facility: CLINIC | Age: 39
End: 2019-10-23

## 2019-10-23 LAB
CCP AB SER IA-ACNC: <8 UNITS
RF+CCP IGG SER-IMP: NEGATIVE

## 2019-10-24 ENCOUNTER — TRANSCRIPTION ENCOUNTER (OUTPATIENT)
Age: 39
End: 2019-10-24

## 2019-10-29 ENCOUNTER — APPOINTMENT (OUTPATIENT)
Dept: ULTRASOUND IMAGING | Facility: CLINIC | Age: 39
End: 2019-10-29

## 2019-10-31 ENCOUNTER — OUTPATIENT (OUTPATIENT)
Dept: OUTPATIENT SERVICES | Facility: HOSPITAL | Age: 39
LOS: 1 days | End: 2019-10-31
Payer: COMMERCIAL

## 2019-10-31 ENCOUNTER — APPOINTMENT (OUTPATIENT)
Dept: MAMMOGRAPHY | Facility: CLINIC | Age: 39
End: 2019-10-31
Payer: COMMERCIAL

## 2019-10-31 DIAGNOSIS — Z00.8 ENCOUNTER FOR OTHER GENERAL EXAMINATION: ICD-10-CM

## 2019-10-31 PROCEDURE — 77063 BREAST TOMOSYNTHESIS BI: CPT

## 2019-10-31 PROCEDURE — 77067 SCR MAMMO BI INCL CAD: CPT | Mod: 26

## 2019-10-31 PROCEDURE — 77067 SCR MAMMO BI INCL CAD: CPT

## 2019-10-31 PROCEDURE — 77063 BREAST TOMOSYNTHESIS BI: CPT | Mod: 26

## 2019-11-08 ENCOUNTER — OUTPATIENT (OUTPATIENT)
Dept: OUTPATIENT SERVICES | Facility: HOSPITAL | Age: 39
LOS: 1 days | End: 2019-11-08
Payer: COMMERCIAL

## 2019-11-08 DIAGNOSIS — R94.31 ABNORMAL ELECTROCARDIOGRAM [ECG] [EKG]: ICD-10-CM

## 2019-11-08 PROCEDURE — 93306 TTE W/DOPPLER COMPLETE: CPT | Mod: 26

## 2019-11-08 PROCEDURE — 93306 TTE W/DOPPLER COMPLETE: CPT

## 2019-11-16 NOTE — ED PROVIDER NOTE - QRS
[address] Repair Preamble Text (Leave Blank If You Do Not Want): Undermining was performed with blunt dissection.

## 2019-11-22 ENCOUNTER — APPOINTMENT (OUTPATIENT)
Dept: CV DIAGNOSITCS | Facility: HOSPITAL | Age: 39
End: 2019-11-22

## 2019-11-27 ENCOUNTER — TRANSCRIPTION ENCOUNTER (OUTPATIENT)
Age: 39
End: 2019-11-27

## 2019-11-27 ENCOUNTER — APPOINTMENT (OUTPATIENT)
Dept: OBGYN | Facility: CLINIC | Age: 39
End: 2019-11-27
Payer: COMMERCIAL

## 2019-11-27 VITALS
HEART RATE: 62 BPM | DIASTOLIC BLOOD PRESSURE: 68 MMHG | TEMPERATURE: 98.4 F | BODY MASS INDEX: 23.27 KG/M2 | HEIGHT: 63.5 IN | SYSTOLIC BLOOD PRESSURE: 93 MMHG | WEIGHT: 133 LBS | OXYGEN SATURATION: 98 %

## 2019-11-27 DIAGNOSIS — R59.9 ENLARGED LYMPH NODES, UNSPECIFIED: ICD-10-CM

## 2019-11-27 PROCEDURE — 99213 OFFICE O/P EST LOW 20 MIN: CPT | Mod: 25

## 2019-11-27 PROCEDURE — 36415 COLL VENOUS BLD VENIPUNCTURE: CPT

## 2019-11-27 RX ORDER — INSULIN GLARGINE 100 [IU]/ML
100 INJECTION, SOLUTION SUBCUTANEOUS
Qty: 1 | Refills: 2 | Status: ACTIVE | COMMUNITY
Start: 2019-11-27 | End: 1900-01-01

## 2019-11-27 NOTE — PHYSICAL EXAM
[Normal] : uterus [No Bleeding] : there was no active vaginal bleeding [Uterine Adnexae] : were not tender and not enlarged [de-identified] : no LAD

## 2020-01-08 ENCOUNTER — APPOINTMENT (OUTPATIENT)
Dept: ENDOCRINOLOGY | Facility: CLINIC | Age: 40
End: 2020-01-08

## 2020-02-07 ENCOUNTER — APPOINTMENT (OUTPATIENT)
Dept: OPHTHALMOLOGY | Facility: CLINIC | Age: 40
End: 2020-02-07

## 2020-03-03 LAB — BRCA 1/2 GENE SEQ. DEL/DU: NEGATIVE

## 2020-03-13 ENCOUNTER — APPOINTMENT (OUTPATIENT)
Dept: OPHTHALMOLOGY | Facility: CLINIC | Age: 40
End: 2020-03-13

## 2020-04-25 ENCOUNTER — MESSAGE (OUTPATIENT)
Age: 40
End: 2020-04-25

## 2020-04-27 ENCOUNTER — APPOINTMENT (OUTPATIENT)
Dept: ENDOCRINOLOGY | Facility: CLINIC | Age: 40
End: 2020-04-27

## 2020-06-11 ENCOUNTER — APPOINTMENT (OUTPATIENT)
Dept: CHRONIC DISEASE MANAGEMENT | Facility: CLINIC | Age: 40
End: 2020-06-11

## 2020-06-11 VITALS — WEIGHT: 135 LBS | BODY MASS INDEX: 23.92 KG/M2 | HEIGHT: 63 IN

## 2020-07-31 ENCOUNTER — RX RENEWAL (OUTPATIENT)
Age: 40
End: 2020-07-31

## 2020-07-31 RX ORDER — BLOOD SUGAR DIAGNOSTIC
STRIP MISCELLANEOUS
Qty: 1 | Refills: 2 | Status: ACTIVE | COMMUNITY
Start: 2017-03-20 | End: 1900-01-01

## 2020-08-25 ENCOUNTER — APPOINTMENT (OUTPATIENT)
Dept: ENDOCRINOLOGY | Facility: CLINIC | Age: 40
End: 2020-08-25
Payer: COMMERCIAL

## 2020-08-25 VITALS
HEART RATE: 75 BPM | DIASTOLIC BLOOD PRESSURE: 72 MMHG | BODY MASS INDEX: 24.1 KG/M2 | SYSTOLIC BLOOD PRESSURE: 118 MMHG | HEIGHT: 63 IN | TEMPERATURE: 98.7 F | OXYGEN SATURATION: 98 % | WEIGHT: 136 LBS

## 2020-08-25 LAB
GLUCOSE BLDC GLUCOMTR-MCNC: 116
HBA1C MFR BLD HPLC: 6.4

## 2020-08-25 PROCEDURE — 99214 OFFICE O/P EST MOD 30 MIN: CPT | Mod: 25

## 2020-08-25 PROCEDURE — 83036 HEMOGLOBIN GLYCOSYLATED A1C: CPT | Mod: QW

## 2020-08-25 PROCEDURE — 82962 GLUCOSE BLOOD TEST: CPT

## 2020-08-25 NOTE — REVIEW OF SYSTEMS
[Fatigue] : no fatigue [Decreased Appetite] : appetite not decreased [Recent Weight Gain (___ Lbs)] : no recent weight gain [Recent Weight Loss (___ Lbs)] : no recent weight loss [Visual Field Defect] : no visual field defect [Dry Eyes] : no dryness [Dysphagia] : no dysphagia [Neck Pain] : no neck pain [Dysphonia] : no dysphonia [Nasal Congestion] : no nasal congestion [Chest Pain] : no chest pain [Slow Heart Rate] : heart rate is not slow [Palpitations] : no palpitations [Fast Heart Rate] : heart rate is not fast [Shortness Of Breath] : no shortness of breath [Cough] : no cough [Nausea] : no nausea [Constipation] : no constipation [Vomiting] : no vomiting [Diarrhea] : no diarrhea [Polyuria] : no polyuria [Irregular Menses] : regular menses [Joint Pain] : no joint pain [Muscle Weakness] : no muscle weakness [Acanthosis] : no acanthosis  [Acne] : no acne [Headaches] : no headaches [Dizziness] : no dizziness [Tremors] : no tremors [Depression] : no depression [Polydipsia] : no polydipsia [Cold Intolerance] : no cold intolerance [Easy Bleeding] : no ~M tendency for easy bleeding [Easy Bruising] : no tendency for easy bruising

## 2020-08-25 NOTE — ASSESSMENT
[FreeTextEntry1] : 39 year old female with history of DM Type 1 here for follow up. \par She is currently managed on the Omnipod and DEXCOM G6. HgA1C of 6.4%. Some hyperglycemia post dinner. \par Advised to improve snacks at bedtime to be more lower glycemic index \par \par -Continue current insulin pump regimen \par -Will need optho/podiatry appointments\par -SMBGs as appropriate \par -Low carb consistent diet\par -Check BMP, lipid panel, microalb/cr ratio \par \par -Follow up in 4-5 months.

## 2020-08-25 NOTE — HISTORY OF PRESENT ILLNESS
[FreeTextEntry1] : Diabetes Follow-up Patient HPI\par \par \par Just recently on Monday at [facility] ( DKA) and she was released in one day \par \par HPI:\par \par Duration of Diabetes: 8 years \par Is patient on Insulin? Yes, 8 years \par If yes, how long on insulin? Insulin pump for 4 years ( omnipod with G5) \par \par List Current Medications for Glycemic control and the doses:\par 1- Insulin pump ( Omnipod with DEXCOM G6) \par 2- \par 3- \par \par SMBG (self monitored blood glucose) readings: \par - Name of glucometer: \par - How often does the patient check BG? \par - Does the patient keep a log? \par \par If detailed record is available, what is the range of most of the BG readings?\par - Before Breakfast: 150s\par - Before Lunch:100-130\par - Before Dinner: 100-180\par - Before Bedtime: 180s\par \par \par Does patient get Hypoglycemic episodes? None at all \par \par \par Diabetic Complications: Is patient aware of having any of those complications?\par - Eyes: Retinopathy? None \par  	When was the last fully dilated eye exam? one year ago \par - Feet: 	Neuropathy? 3-4 times per week \par  	Foot Ulcers? None \par 	When was the last time patient saw a Podiatrist? None \par - Kidneys: Nephropathy? GFr of 111, microalb/cr: 37 \par \par \par Diet: review patient's diet: \par Breakfast: Bread with peanut butter \par Lunch: Salad, turkey, bread \par Dinner: Salad\par Snacks: Potato chips, pretzels \par Juice or soda: None \par Desserts: None \par \par \par Exercise: review patient exercise habits: \par Peloton x 3 times per week \par \par Symptoms: Write any symptoms, concerns and issues bothering the patient which have not be addressed above: \par No blurry vision, thirst or urination \par \par

## 2020-08-25 NOTE — PHYSICAL EXAM
[Alert] : alert [Well Nourished] : well nourished [No Acute Distress] : no acute distress [Normal Sclera/Conjunctiva] : normal sclera/conjunctiva [EOMI] : extra ocular movement intact [PERRL] : pupils equal, round and reactive to light [No Proptosis] : no proptosis [Normal Outer Ear/Nose] : the ears and nose were normal in appearance [Normal TMs] : both tympanic membranes were normal [No Neck Mass] : no neck mass was observed [Thyroid Not Enlarged] : the thyroid was not enlarged [No Respiratory Distress] : no respiratory distress [Clear to Auscultation] : lungs were clear to auscultation bilaterally [Normal to Percussion] : lungs were normal to percussion [Normal S1, S2] : normal S1 and S2 [Normal Rate] : heart rate was normal [Regular Rhythm] : with a regular rhythm [Normal Bowel Sounds] : normal bowel sounds [Not Tender] : non-tender [Soft] : abdomen soft [Normal Gait] : normal gait [No Clubbing, Cyanosis] : no clubbing  or cyanosis of the fingernails [No Joint Swelling] : no joint swelling seen [Normal Strength/Tone] : muscle strength and tone were normal [No Rash] : no rash [No Skin Lesions] : no skin lesions [No Motor Deficits] : the motor exam was normal [Normal Reflexes] : deep tendon reflexes were 2+ and symmetric [No Tremors] : no tremors [Oriented x3] : oriented to person, place, and time [Normal Affect] : the affect was normal [Normal Insight/Judgement] : insight and judgment were intact [Normal Mood] : the mood was normal

## 2020-09-08 LAB
ALBUMIN SERPL ELPH-MCNC: 4.7 G/DL
ALP BLD-CCNC: 59 U/L
ALT SERPL-CCNC: 9 U/L
ANION GAP SERPL CALC-SCNC: 12 MMOL/L
AST SERPL-CCNC: 14 U/L
BILIRUB SERPL-MCNC: 0.7 MG/DL
BUN SERPL-MCNC: 12 MG/DL
CALCIUM SERPL-MCNC: 9.3 MG/DL
CHLORIDE SERPL-SCNC: 106 MMOL/L
CHOLEST SERPL-MCNC: 186 MG/DL
CHOLEST/HDLC SERPL: 2.5 RATIO
CO2 SERPL-SCNC: 18 MMOL/L
CREAT SERPL-MCNC: 0.8 MG/DL
CREAT SPEC-SCNC: 57 MG/DL
ESTIMATED AVERAGE GLUCOSE: 140 MG/DL
GLUCOSE SERPL-MCNC: 139 MG/DL
HBA1C MFR BLD HPLC: 6.5 %
HDLC SERPL-MCNC: 74 MG/DL
LDLC SERPL CALC-MCNC: 88 MG/DL
MICROALBUMIN 24H UR DL<=1MG/L-MCNC: <1.2 MG/DL
MICROALBUMIN/CREAT 24H UR-RTO: NORMAL MG/G
POTASSIUM SERPL-SCNC: 4.2 MMOL/L
PROT SERPL-MCNC: 6.7 G/DL
SODIUM SERPL-SCNC: 137 MMOL/L
TRIGL SERPL-MCNC: 120 MG/DL

## 2020-09-09 NOTE — CONSULT NOTE ADULT - PROBLEM SELECTOR RECOMMENDATION 4
Group Note  Date: 9/9/20  Start Time: 1000  End Time:  1004  Number of Participants in Group: 1  Number of Patients on Unit: 2  Reason for Absence: Patient discharging home  Intervention for patient absence: Gave patient information on preventing relapse of alcohol abuse. Type of Group:  Community __    [place] __    Psychoeducational _x_  Spirituality__                               Cognitive Skills__  Recovery __  Wellness __  Recreation __                                            Wrap-Up/Relaxation __       Problem: Alcohol Use disorder      Goal for Group: Patient reads handout given for preventing relapse. Staff Intervention: Nurse gave patient handout on preventing relapse.       Participation Level:  High _x__   Medium ___   Low____     Patient Behavior:  Quiet __  Attentive _x_  Disruptive ___ Poor Eye-Contact ___                                  Good Eye-Contact___  Insightful ___  Negative ____                                 Distracted__  Defensive ___  Agitated___  Cooperative _x__                                 Needed prompted__  Monopolizing ___ Oppositional___       Mental Status/Mood: Depressed__  Tearful__  Manic__  Elevated ___ Flat___                                      Blunted __  Grandiose___  Angry__  Labile ___                                      Congruent_x__  Incongruent___ Sad ___ Hallucinations ___                                      Delusions___  Anxious ____       Patient Response to Learning: Patient accepted handout       Notes/Comments:            Discipline Responsible: Nurse     Signature (with credentials):  Electronically signed by Sebas Bernard RN on 9/9/2020 at 11:17 AM -check fasting lipids in am  Jessica Holloway MD  Pager: [phone]  Nights and Weekends: [phone]

## 2020-09-15 ENCOUNTER — APPOINTMENT (OUTPATIENT)
Dept: INTERNAL MEDICINE | Facility: CLINIC | Age: 40
End: 2020-09-15

## 2020-10-06 ENCOUNTER — APPOINTMENT (OUTPATIENT)
Dept: OBGYN | Facility: CLINIC | Age: 40
End: 2020-10-06

## 2020-10-28 ENCOUNTER — APPOINTMENT (OUTPATIENT)
Dept: OPHTHALMOLOGY | Facility: CLINIC | Age: 40
End: 2020-10-28

## 2020-11-09 ENCOUNTER — RESULT REVIEW (OUTPATIENT)
Age: 40
End: 2020-11-09

## 2020-11-20 ENCOUNTER — RX RENEWAL (OUTPATIENT)
Age: 40
End: 2020-11-20

## 2020-11-21 ENCOUNTER — APPOINTMENT (OUTPATIENT)
Dept: ULTRASOUND IMAGING | Facility: IMAGING CENTER | Age: 40
End: 2020-11-21
Payer: COMMERCIAL

## 2020-11-21 ENCOUNTER — OUTPATIENT (OUTPATIENT)
Dept: OUTPATIENT SERVICES | Facility: HOSPITAL | Age: 40
LOS: 1 days | End: 2020-11-21
Payer: COMMERCIAL

## 2020-11-21 ENCOUNTER — APPOINTMENT (OUTPATIENT)
Dept: MAMMOGRAPHY | Facility: IMAGING CENTER | Age: 40
End: 2020-11-21
Payer: COMMERCIAL

## 2020-11-21 DIAGNOSIS — Z00.8 ENCOUNTER FOR OTHER GENERAL EXAMINATION: ICD-10-CM

## 2020-11-21 PROCEDURE — 77067 SCR MAMMO BI INCL CAD: CPT | Mod: 26

## 2020-11-21 PROCEDURE — 76641 ULTRASOUND BREAST COMPLETE: CPT

## 2020-11-21 PROCEDURE — 77063 BREAST TOMOSYNTHESIS BI: CPT | Mod: 26

## 2020-11-21 PROCEDURE — 77067 SCR MAMMO BI INCL CAD: CPT

## 2020-11-21 PROCEDURE — 76641 ULTRASOUND BREAST COMPLETE: CPT | Mod: 26,50

## 2020-11-21 PROCEDURE — 77063 BREAST TOMOSYNTHESIS BI: CPT

## 2020-11-26 LAB — SARS-COV-2 N GENE NPH QL NAA+PROBE: NOT DETECTED

## 2020-12-14 ENCOUNTER — APPOINTMENT (OUTPATIENT)
Dept: ENDOCRINOLOGY | Facility: CLINIC | Age: 40
End: 2020-12-14
Payer: COMMERCIAL

## 2020-12-14 VITALS
TEMPERATURE: 97.8 F | BODY MASS INDEX: 23.57 KG/M2 | SYSTOLIC BLOOD PRESSURE: 120 MMHG | WEIGHT: 133 LBS | HEIGHT: 63 IN | DIASTOLIC BLOOD PRESSURE: 72 MMHG | HEART RATE: 77 BPM | OXYGEN SATURATION: 97 %

## 2020-12-14 LAB
GLUCOSE BLDC GLUCOMTR-MCNC: 144
HBA1C MFR BLD HPLC: 7.1

## 2020-12-14 PROCEDURE — 83036 HEMOGLOBIN GLYCOSYLATED A1C: CPT | Mod: QW

## 2020-12-14 PROCEDURE — 82962 GLUCOSE BLOOD TEST: CPT

## 2020-12-14 PROCEDURE — 99214 OFFICE O/P EST MOD 30 MIN: CPT | Mod: 25

## 2020-12-14 PROCEDURE — 99072 ADDL SUPL MATRL&STAF TM PHE: CPT

## 2020-12-14 NOTE — REVIEW OF SYSTEMS
[Fatigue] : no fatigue [Decreased Appetite] : appetite not decreased [Recent Weight Gain (___ Lbs)] : no recent weight gain [Recent Weight Loss (___ Lbs)] : no recent weight loss [Visual Field Defect] : no visual field defect [Dry Eyes] : no dryness [Dysphagia] : no dysphagia [Neck Pain] : no neck pain [Dysphonia] : no dysphonia [Nasal Congestion] : no nasal congestion [Chest Pain] : no chest pain [Slow Heart Rate] : heart rate is not slow [Palpitations] : no palpitations [Fast Heart Rate] : heart rate is not fast [Shortness Of Breath] : no shortness of breath [Cough] : no cough [Nausea] : no nausea [Constipation] : no constipation [Vomiting] : no vomiting [Diarrhea] : no diarrhea [Polyuria] : no polyuria [Irregular Menses] : regular menses [Joint Pain] : no joint pain [Headaches] : no headaches [Dizziness] : no dizziness [Tremors] : no tremors [Pain/Numbness of Digits] : no pain/numbness of digits [Polydipsia] : no polydipsia [Cold Intolerance] : no cold intolerance [Easy Bleeding] : no ~M tendency for easy bleeding [Easy Bruising] : no tendency for easy bruising

## 2020-12-14 NOTE — ASSESSMENT
[FreeTextEntry1] : 40 year old female with history of DM Type 1 here for follow up. \par She is currently managed on the Omnipod and DEXCOM G6. HgA1C of 7.1%. \par She has been having increased hyperglycemia due to increased snacking. \par Advised to adjust lifestyle and re-send DEXCOM reading in 2 weeks \par \par -Continue current insulin pump regimen \par -Uptodate with optho\par -SMBGs as appropriate \par -Low carb consistent diet\par -Check BMP, lipid panel, microalb/cr ratio on next visit \par \par -Follow up in 4-5 months

## 2020-12-14 NOTE — HISTORY OF PRESENT ILLNESS
[FreeTextEntry1] : Diabetes Follow-up Patient HPI\par \par \par Just recently on Monday at [facility] ( DKA) and she was released in one day \par \par HPI:\par \par Duration of Diabetes: 8 years \par Is patient on Insulin? Yes, 8 years \par If yes, how long on insulin? Insulin pump for 4 years ( omnipod with G5) \par \par List Current Medications for Glycemic control and the doses:\par 1- Insulin pump ( Omnipod with DEXCOM G6) \par 2- \par 3- \par \par SMBG (self monitored blood glucose) readings: \par - Name of glucometer: \par - How often does the patient check BG? \par - Does the patient keep a log? \par \par If detailed record is available, what is the range of most of the BG readings?\par - Before Breakfast:110-120\par - Before Lunch:100-130\par - Before Dinner: 150-180\par - Before Bedtime: 140s\par \par \par Does patient get Hypoglycemic episodes? None at all ( Only during her menstrual cycle) \par \par \par Diabetic Complications: Is patient aware of having any of those complications?\par - Eyes: Retinopathy? None \par  	When was the last fully dilated eye exam? one year ago, pending appt tomorrow \par - Feet: 	Neuropathy? None \par  	Foot Ulcers? None \par 	When was the last time patient saw a Podiatrist? None \par - Kidneys: Nephropathy? GFr of 92, microalb/cr: 37 \par \par \par Diet: review patient's diet: \par Breakfast: Bread with peanut butter \par Lunch: Salad, turkey, bread \par Dinner: Salad\par Snacks: Potato chips, pretzels \par Juice or soda: None \par Desserts: None \par \par \par Exercise: review patient exercise habits: \par Peloton x Not been doing it \par \par Symptoms: Write any symptoms, concerns and issues bothering the patient which have not be addressed above: \par No blurry vision, thirst or urination \par \par

## 2020-12-14 NOTE — PHYSICAL EXAM
[Alert] : alert [Well Nourished] : well nourished [No Acute Distress] : no acute distress [Normal Sclera/Conjunctiva] : normal sclera/conjunctiva [EOMI] : extra ocular movement intact [PERRL] : pupils equal, round and reactive to light [Normal Outer Ear/Nose] : the ears and nose were normal in appearance [Normal Hearing] : hearing was normal [Normal TMs] : both tympanic membranes were normal [No Neck Mass] : no neck mass was observed [Thyroid Not Enlarged] : the thyroid was not enlarged [No Respiratory Distress] : no respiratory distress [Clear to Auscultation] : lungs were clear to auscultation bilaterally [Normal S1, S2] : normal S1 and S2 [Normal Rate] : heart rate was normal [Regular Rhythm] : with a regular rhythm [Normal Gait] : normal gait [No Clubbing, Cyanosis] : no clubbing  or cyanosis of the fingernails [No Joint Swelling] : no joint swelling seen [Normal Strength/Tone] : muscle strength and tone were normal [No Rash] : no rash [No Skin Lesions] : no skin lesions [No Motor Deficits] : the motor exam was normal [Normal Reflexes] : deep tendon reflexes were 2+ and symmetric [No Tremors] : no tremors [Oriented x3] : oriented to person, place, and time [Normal Affect] : the affect was normal [Normal Insight/Judgement] : insight and judgment were intact [Normal Mood] : the mood was normal

## 2020-12-15 ENCOUNTER — APPOINTMENT (OUTPATIENT)
Dept: OPHTHALMOLOGY | Facility: CLINIC | Age: 40
End: 2020-12-15

## 2020-12-15 LAB — ABO + RH PNL BLD: NORMAL

## 2020-12-21 PROBLEM — Z01.419 ENCOUNTER FOR ANNUAL ROUTINE GYNECOLOGICAL EXAMINATION: Status: RESOLVED | Noted: 2018-06-06 | Resolved: 2020-12-21

## 2021-02-15 ENCOUNTER — NON-APPOINTMENT (OUTPATIENT)
Age: 41
End: 2021-02-15

## 2021-02-16 ENCOUNTER — APPOINTMENT (OUTPATIENT)
Dept: OPHTHALMOLOGY | Facility: CLINIC | Age: 41
End: 2021-02-16
Payer: COMMERCIAL

## 2021-02-16 PROCEDURE — 92310 CONTACT LENS FITTING OU: CPT

## 2021-02-16 PROCEDURE — 99214 OFFICE O/P EST MOD 30 MIN: CPT

## 2021-02-16 PROCEDURE — 99204 OFFICE O/P NEW MOD 45 MIN: CPT

## 2021-03-16 ENCOUNTER — NON-APPOINTMENT (OUTPATIENT)
Age: 41
End: 2021-03-16

## 2021-03-16 DIAGNOSIS — Z11.3 ENCOUNTER FOR SCREENING FOR INFECTIONS WITH A PREDOMINANTLY SEXUAL MODE OF TRANSMISSION: ICD-10-CM

## 2021-03-16 DIAGNOSIS — Z12.11 ENCOUNTER FOR SCREENING FOR MALIGNANT NEOPLASM OF COLON: ICD-10-CM

## 2021-03-16 DIAGNOSIS — Z20.822 CONTACT WITH AND (SUSPECTED) EXPOSURE TO COVID-19: ICD-10-CM

## 2021-03-23 ENCOUNTER — NON-APPOINTMENT (OUTPATIENT)
Age: 41
End: 2021-03-23

## 2021-03-24 ENCOUNTER — APPOINTMENT (OUTPATIENT)
Dept: INTERNAL MEDICINE | Facility: CLINIC | Age: 41
End: 2021-03-24
Payer: COMMERCIAL

## 2021-03-24 DIAGNOSIS — Z80.3 FAMILY HISTORY OF MALIGNANT NEOPLASM OF BREAST: ICD-10-CM

## 2021-03-24 PROCEDURE — 99396 PREV VISIT EST AGE 40-64: CPT

## 2021-03-24 PROCEDURE — G0442 ANNUAL ALCOHOL SCREEN 15 MIN: CPT | Mod: NC

## 2021-03-24 PROCEDURE — G0444 DEPRESSION SCREEN ANNUAL: CPT | Mod: NC,59

## 2021-03-24 PROCEDURE — 99072 ADDL SUPL MATRL&STAF TM PHE: CPT

## 2021-03-25 ENCOUNTER — LABORATORY RESULT (OUTPATIENT)
Age: 41
End: 2021-03-25

## 2021-03-26 VITALS
DIASTOLIC BLOOD PRESSURE: 70 MMHG | SYSTOLIC BLOOD PRESSURE: 98 MMHG | RESPIRATION RATE: 14 BRPM | HEART RATE: 74 BPM | BODY MASS INDEX: 24.09 KG/M2 | WEIGHT: 136 LBS

## 2021-03-26 PROBLEM — Z80.3 FAMILY HISTORY OF BREAST CANCER: Status: ACTIVE | Noted: 2020-02-13

## 2021-03-26 LAB
25(OH)D3 SERPL-MCNC: 24.6 NG/ML
ALBUMIN SERPL ELPH-MCNC: 4.6 G/DL
ALP BLD-CCNC: 63 U/L
ALT SERPL-CCNC: 124 U/L
ANION GAP SERPL CALC-SCNC: 10 MMOL/L
AST SERPL-CCNC: 101 U/L
BILIRUB SERPL-MCNC: 0.4 MG/DL
BUN SERPL-MCNC: 19 MG/DL
CALCIUM SERPL-MCNC: 9.7 MG/DL
CHLORIDE SERPL-SCNC: 106 MMOL/L
CHOLEST SERPL-MCNC: 178 MG/DL
CO2 SERPL-SCNC: 21 MMOL/L
CREAT SERPL-MCNC: 0.76 MG/DL
GLUCOSE SERPL-MCNC: 100 MG/DL
HDLC SERPL-MCNC: 66 MG/DL
LDLC SERPL CALC-MCNC: 102 MG/DL
LDLC SERPL DIRECT ASSAY-MCNC: 107 MG/DL
NONHDLC SERPL-MCNC: 112 MG/DL
POTASSIUM SERPL-SCNC: 5.1 MMOL/L
PROT SERPL-MCNC: 7.1 G/DL
SODIUM SERPL-SCNC: 137 MMOL/L
TRIGL SERPL-MCNC: 47 MG/DL

## 2021-03-26 NOTE — HISTORY OF PRESENT ILLNESS
[FreeTextEntry1] : Ms. ORTIZ is here for an annual physical examination and assessment.\par  [de-identified] : She generally feels well with no specific complaints. Her recent health has been good.\par \par Denies headache, dizziness.\par Denies rash, fatigue, fever, weight loss, anorexia.\par Denies cough, wheezing.\par Denies CP, SOB, BYRD, edema, palpitations.\par Denies abdominal pain, N/V/D/C. Denies BRBPR, melena, dysphagia.\par Denies dysuria, frequency, hematuria, hesitancy.\par Denies weakness, numbness, gait instability.\par

## 2021-03-26 NOTE — HEALTH RISK ASSESSMENT
[] : No [Audit-CScore] : 2 [[id number]] : 0 [Change in mental status noted] : No change in mental status noted [Language] : denies difficulty with language [Behavior] : denies difficulty with behavior [Learning/Retaining New Information] : denies difficulty learning/retaining new information [Handling Complex Tasks] : denies difficulty handling complex tasks [Reasoning] : denies difficulty with reasoning [Spatial Ability and Orientation] : denies difficulty with spatial ability and orientation [Reports changes in hearing] : Reports no changes in hearing [Reports changes in vision] : Reports no changes in vision [Reports changes in dental health] : Reports no changes in dental health

## 2021-03-30 LAB
ALBUMIN SERPL ELPH-MCNC: 4.9 G/DL
ALP BLD-CCNC: 72 U/L
ALT SERPL-CCNC: 127 U/L
ANION GAP SERPL CALC-SCNC: 13 MMOL/L
AST SERPL-CCNC: 103 U/L
BASOPHILS # BLD AUTO: 0.06 K/UL
BASOPHILS NFR BLD AUTO: 1.6 %
BILIRUB SERPL-MCNC: 0.5 MG/DL
BUN SERPL-MCNC: 12 MG/DL
CALCIUM SERPL-MCNC: 9.3 MG/DL
CHLORIDE SERPL-SCNC: 104 MMOL/L
CO2 SERPL-SCNC: 23 MMOL/L
CREAT SERPL-MCNC: 0.7 MG/DL
EOSINOPHIL # BLD AUTO: 0.09 K/UL
EOSINOPHIL NFR BLD AUTO: 2.4 %
ESTIMATED AVERAGE GLUCOSE: 151 MG/DL
FERRITIN SERPL-MCNC: 10 NG/ML
GGT SERPL-CCNC: 14 U/L
GLUCOSE SERPL-MCNC: 143 MG/DL
HBA1C MFR BLD HPLC: 6.9 %
HBV SURFACE AG SER QL: NONREACTIVE
HCT VFR BLD CALC: 39 %
HCV AB SER QL: NONREACTIVE
HCV S/CO RATIO: 0.07 S/CO
HGB BLD-MCNC: 12.7 G/DL
IMM GRANULOCYTES NFR BLD AUTO: 0 %
IRON SATN MFR SERPL: 22 %
IRON SERPL-MCNC: 78 UG/DL
LYMPHOCYTES # BLD AUTO: 1.51 K/UL
LYMPHOCYTES NFR BLD AUTO: 39.7 %
MAN DIFF?: NORMAL
MCHC RBC-ENTMCNC: 27.9 PG
MCHC RBC-ENTMCNC: 32.6 GM/DL
MCV RBC AUTO: 85.7 FL
MONOCYTES # BLD AUTO: 0.35 K/UL
MONOCYTES NFR BLD AUTO: 9.2 %
NEUTROPHILS # BLD AUTO: 1.79 K/UL
NEUTROPHILS NFR BLD AUTO: 47.1 %
PLATELET # BLD AUTO: 208 K/UL
POTASSIUM SERPL-SCNC: 4.5 MMOL/L
PROT SERPL-MCNC: 7 G/DL
RBC # BLD: 4.55 M/UL
RBC # FLD: 13.3 %
SODIUM SERPL-SCNC: 140 MMOL/L
TIBC SERPL-MCNC: 347 UG/DL
UIBC SERPL-MCNC: 269 UG/DL
WBC # FLD AUTO: 3.8 K/UL

## 2021-03-31 LAB
ALBUMIN MFR SERPL ELPH: 65.9 %
ALBUMIN SERPL-MCNC: 5 G/DL
ALBUMIN/GLOB SERPL: 1.9 RATIO
ALPHA1 GLOB MFR SERPL ELPH: 3.4 %
ALPHA1 GLOB SERPL ELPH-MCNC: 0.3 G/DL
ALPHA2 GLOB MFR SERPL ELPH: 7.8 %
ALPHA2 GLOB SERPL ELPH-MCNC: 0.6 G/DL
B-GLOBULIN MFR SERPL ELPH: 9.5 %
B-GLOBULIN SERPL ELPH-MCNC: 0.7 G/DL
CERULOPLASMIN SERPL-MCNC: 24 MG/DL
DEPRECATED KAPPA LC FREE/LAMBDA SER: 1.34 RATIO
GAMMA GLOB FLD ELPH-MCNC: 1 G/DL
GAMMA GLOB MFR SERPL ELPH: 13.4 %
IGA SER QL IEP: 173 MG/DL
IGG SER QL IEP: 1102 MG/DL
IGM SER QL IEP: 93 MG/DL
INTERPRETATION SERPL IEP-IMP: NORMAL
KAPPA LC CSF-MCNC: 1.73 MG/DL
KAPPA LC SERPL-MCNC: 2.31 MG/DL
LKM AB SER QL IF: <20.1 UNITS
M PROTEIN SPEC IFE-MCNC: NORMAL
MITOCHONDRIA AB SER IF-ACNC: NORMAL
PROT SERPL-MCNC: 7.6 G/DL
PROT SERPL-MCNC: 7.6 G/DL
SMOOTH MUSCLE AB SER QL IF: NORMAL

## 2021-04-01 LAB
ANA PAT FLD IF-IMP: ABNORMAL
ANA SER IF-ACNC: ABNORMAL
IGG SUBSET TOTAL IGG: 1050 MG/DL
IGG1 SER-MCNC: 419 MG/DL
IGG2 SER-MCNC: 504 MG/DL
IGG3 SER-MCNC: 84 MG/DL
IGG4 SER-MCNC: 27 MG/DL

## 2021-04-05 ENCOUNTER — RX RENEWAL (OUTPATIENT)
Age: 41
End: 2021-04-05

## 2021-04-13 ENCOUNTER — OUTPATIENT (OUTPATIENT)
Dept: OUTPATIENT SERVICES | Facility: HOSPITAL | Age: 41
LOS: 1 days | End: 2021-04-13
Payer: COMMERCIAL

## 2021-04-13 ENCOUNTER — APPOINTMENT (OUTPATIENT)
Dept: ULTRASOUND IMAGING | Facility: CLINIC | Age: 41
End: 2021-04-13
Payer: COMMERCIAL

## 2021-04-13 DIAGNOSIS — Z00.8 ENCOUNTER FOR OTHER GENERAL EXAMINATION: ICD-10-CM

## 2021-04-13 PROCEDURE — 76700 US EXAM ABDOM COMPLETE: CPT

## 2021-04-13 PROCEDURE — 76700 US EXAM ABDOM COMPLETE: CPT | Mod: 26

## 2021-04-21 ENCOUNTER — APPOINTMENT (OUTPATIENT)
Dept: HEPATOLOGY | Facility: CLINIC | Age: 41
End: 2021-04-21
Payer: COMMERCIAL

## 2021-04-21 VITALS
BODY MASS INDEX: 24.1 KG/M2 | DIASTOLIC BLOOD PRESSURE: 76 MMHG | RESPIRATION RATE: 16 BRPM | SYSTOLIC BLOOD PRESSURE: 116 MMHG | HEIGHT: 63 IN | WEIGHT: 136 LBS | HEART RATE: 71 BPM | TEMPERATURE: 97.2 F | OXYGEN SATURATION: 98 %

## 2021-04-21 DIAGNOSIS — Z78.9 OTHER SPECIFIED HEALTH STATUS: ICD-10-CM

## 2021-04-21 PROCEDURE — 99214 OFFICE O/P EST MOD 30 MIN: CPT

## 2021-04-21 PROCEDURE — 99072 ADDL SUPL MATRL&STAF TM PHE: CPT

## 2021-04-21 RX ORDER — CARBOXYMETHYLCELLULOSE SODIUM, GLYCERIN 5; 9 MG/ML; MG/ML
0.5-0.9 SOLUTION/ DROPS OPHTHALMIC
Qty: 1 | Refills: 3 | Status: DISCONTINUED | COMMUNITY
Start: 2017-08-17 | End: 2021-04-21

## 2021-04-22 LAB
ALBUMIN SERPL ELPH-MCNC: 4.7 G/DL
ALP BLD-CCNC: 64 U/L
ALT SERPL-CCNC: 92 U/L
ANION GAP SERPL CALC-SCNC: 13 MMOL/L
APTT BLD: 33.9 SEC
AST SERPL-CCNC: 71 U/L
BASOPHILS # BLD AUTO: 0.05 K/UL
BASOPHILS NFR BLD AUTO: 1 %
BILIRUB DIRECT SERPL-MCNC: 0.1 MG/DL
BILIRUB INDIRECT SERPL-MCNC: 0.5 MG/DL
BILIRUB SERPL-MCNC: 0.6 MG/DL
BUN SERPL-MCNC: 14 MG/DL
CALCIUM SERPL-MCNC: 9.7 MG/DL
CHLORIDE SERPL-SCNC: 102 MMOL/L
CO2 SERPL-SCNC: 22 MMOL/L
COVID-19 NUCLEOCAPSID  GAM ANTIBODY INTERPRETATION: NEGATIVE
COVID-19 SPIKE DOMAIN ANTIBODY INTERPRETATION: NEGATIVE
CREAT SERPL-MCNC: 0.74 MG/DL
EOSINOPHIL # BLD AUTO: 0.05 K/UL
EOSINOPHIL NFR BLD AUTO: 1 %
GLUCOSE SERPL-MCNC: 111 MG/DL
HAV IGM SER QL: NONREACTIVE
HBV CORE IGG+IGM SER QL: NONREACTIVE
HBV CORE IGM SER QL: NONREACTIVE
HBV SURFACE AG SER QL: NONREACTIVE
HCT VFR BLD CALC: 35.3 %
HEPATITIS A IGG ANTIBODY: NONREACTIVE
HGB BLD-MCNC: 11.8 G/DL
IMM GRANULOCYTES NFR BLD AUTO: 0.2 %
INR PPP: 1.03 RATIO
LYMPHOCYTES # BLD AUTO: 1.64 K/UL
LYMPHOCYTES NFR BLD AUTO: 33.1 %
MAN DIFF?: NORMAL
MCHC RBC-ENTMCNC: 28.3 PG
MCHC RBC-ENTMCNC: 33.4 GM/DL
MCV RBC AUTO: 84.7 FL
MONOCYTES # BLD AUTO: 0.39 K/UL
MONOCYTES NFR BLD AUTO: 7.9 %
NEUTROPHILS # BLD AUTO: 2.82 K/UL
NEUTROPHILS NFR BLD AUTO: 56.8 %
PLATELET # BLD AUTO: 211 K/UL
POTASSIUM SERPL-SCNC: 3.9 MMOL/L
PROT SERPL-MCNC: 6.8 G/DL
PT BLD: 12.1 SEC
RBC # BLD: 4.17 M/UL
RBC # FLD: 13.5 %
SARS-COV-2 AB SERPL IA-ACNC: 0.4 U/ML
SARS-COV-2 AB SERPL QL IA: 0.08 INDEX
SODIUM SERPL-SCNC: 138 MMOL/L
WBC # FLD AUTO: 4.96 K/UL

## 2021-04-23 LAB
ANA SER IF-ACNC: NEGATIVE
CMV IGG SERPL QL: >10 U/ML
CMV IGG SERPL-IMP: POSITIVE
CMV IGM SERPL QL: <8 AU/ML
CMV IGM SERPL QL: NEGATIVE
DEPRECATED KAPPA LC FREE/LAMBDA SER: 1.33 RATIO
EBV EA AB SER IA-ACNC: <5 U/ML
EBV EA AB TITR SER IF: POSITIVE
EBV EA IGG SER QL IA: 255 U/ML
EBV EA IGG SER-ACNC: NEGATIVE
EBV EA IGM SER IA-ACNC: NEGATIVE
EBV PATRN SPEC IB-IMP: NORMAL
EBV VCA IGG SER IA-ACNC: 400 U/ML
EBV VCA IGM SER QL IA: <10 U/ML
EPSTEIN-BARR VIRUS CAPSID ANTIGEN IGG: POSITIVE
HBV SURFACE AB SER QL: NONREACTIVE
HSV 1+2 IGG SER IA-IMP: POSITIVE
HSV 1+2 IGG SER IA-IMP: POSITIVE
HSV1 IGG SER QL: 42.4 INDEX
HSV2 IGG SER QL: 8.96 INDEX
IGA SER QL IEP: 141 MG/DL
IGG SER QL IEP: 1008 MG/DL
IGM SER QL IEP: 82 MG/DL
KAPPA LC CSF-MCNC: 1.46 MG/DL
KAPPA LC SERPL-MCNC: 1.94 MG/DL
MITOCHONDRIA AB SER IF-ACNC: NORMAL
SMOOTH MUSCLE AB SER QL IF: NORMAL
TTG IGA SER IA-ACNC: <1.2 U/ML
TTG IGA SER-ACNC: NEGATIVE

## 2021-04-25 NOTE — REVIEW OF SYSTEMS
[Negative] : Heme/Lymph [Fever] : no fever [Chills] : no chills [Feeling Poorly] : not feeling poorly [Feeling Tired] : not feeling tired [Recent Weight Gain (___ Lbs)] : no recent weight gain [Recent Weight Loss (___ Lbs)] : no recent weight loss [Dysuria] : no dysuria [Itching] : no itching

## 2021-04-25 NOTE — ASSESSMENT
[FreeTextEntry1] : 39 yo Female with Hx of IDDM (using insulin pump), prior hospitalizations for DKA, vitamin D deficiency, recent COVID-19 exposure, was referred for acute, moderate rise of transaminases, with normal cholestatic parameters and normal synthetic function, with mild HARINDER positivity (1:160).\par \par Will repeat liver enzymes, transient elevation?\par Discussed that viral or toxic factors can cause transient elevation. Hx of COVID exposure, but patient reportedly tested negative. Will get spike and nucleocapside Ab. Patient has not been vaccinated yet.\par Patient is with Type I DM, and pos HARINDER 1:160, thus AIH is also in differential, will obtain repeat / complete AI workup and repeat liver enzymes. US abd was neg. \par Will order FibroScan next visit, discussed that acute rise / inflammation can cause falsely high\par Patient also reports recent Herpes Dx, discussed that while can cause liver abnormalities, usually would be higher\par She is using 82 M which contains minoxidil and tretinoin, on scalp. Advised to hold off with it for now. While liver enzyme elevations are uncommon with minoxidil, it has been reported even with topical formulations. Tretinoin similarly could be potentially hepatotoxic. \par \par RTC 2 weeks

## 2021-04-25 NOTE — PHYSICAL EXAM
[Non-Tender] : non-tender [General Appearance - Alert] : alert [General Appearance - In No Acute Distress] : in no acute distress [General Appearance - Well Nourished] : well nourished [General Appearance - Well Developed] : well developed [Sclera] : the sclera and conjunctiva were normal [Oropharynx] : the oropharynx was normal [Neck Appearance] : the appearance of the neck was normal [Respiration, Rhythm And Depth] : normal respiratory rhythm and effort [Exaggerated Use Of Accessory Muscles For Inspiration] : no accessory muscle use [Auscultation Breath Sounds / Voice Sounds] : lungs were clear to auscultation bilaterally [Heart Rate And Rhythm] : heart rate was normal and rhythm regular [Heart Sounds] : normal S1 and S2 [Edema] : there was no peripheral edema [Bowel Sounds] : normal bowel sounds [Abdomen Tenderness] : non-tender [Abdomen Soft] : soft [] : no hepato-splenomegaly [Cervical Lymph Nodes Enlarged Posterior Bilaterally] : posterior cervical [Cervical Lymph Nodes Enlarged Anterior Bilaterally] : anterior cervical [Supraclavicular Lymph Nodes Enlarged Bilaterally] : supraclavicular [Axillary Lymph Nodes Enlarged Bilaterally] : axillary [Inguinal Lymph Nodes Enlarged Bilaterally] : inguinal [No CVA Tenderness] : no ~M costovertebral angle tenderness [No Spinal Tenderness] : no spinal tenderness [Abnormal Walk] : normal gait [Skin Color & Pigmentation] : normal skin color and pigmentation [Skin Turgor] : normal skin turgor [Oriented To Time, Place, And Person] : oriented to person, place, and time [Impaired Insight] : insight and judgment were intact [Affect] : the affect was normal [Mood] : the mood was normal [Scleral Icterus] : No Scleral Icterus [Spider Angioma] : No spider angioma(s) were observed [Abdominal  Ascites] : no ascites [Liver Palpable ___ Finger Breadths Below Costal Margin] : was not palpable below costal margin [Asterixis] : no asterixis observed [Jaundice] : No jaundice [Palmar Erythema] : no Palmar Erythema [Depression] : no depression [FreeTextEntry1] : Grossly intact

## 2021-04-25 NOTE — HISTORY OF PRESENT ILLNESS
[de-identified] : 41 yo Female with Hx of IDDM (using insulin pump), prior hospitalizations for DKA, vitamin D deficiency, recent COVID-19 exposure, was referred for abnormal liver enzymes. \par \par She had normal liver enzymes in the past between 2016 and 8/2020. On 3/25/21 she was found to have moderately elevated transaminases ( and ), that was confirmed with a repeat test on 3/29/21. Cholestatic parameters were normal (ALP 72, Se bi 0.5), synthetic parameters normal (alb 4.9). GGT was normal. \par Liver workup so far / Labs from 3/2021: transferrin sat 22%, ferritin 10, HBsAG neg, HCV ab neg, Ceruloplasmin 24, IgG 1102, IgM 93, IgG 4 27, serum protein elfo; no monoclonal band, anti LKM neg, AMA neg, ASMA neg, HARINDER 1: 160, HbA1c 6.9%, Chol 178, LDL 1002, TG 47, COVID-19 nucelocapsid and spike Ab neg on 3/25/21, 25OH vit D 24.6\par \par US abd on 4/13/21 showed normal liver, normal biliary tract, and a small renal angiomyolipoma. \par \par Using 82 M (containing Minoxidil sulfate), started to use a year ago intermittently, last time yesterday, but before that a month ago. Denied other OTC meds, toxic habits, herbal supplements.\par Also reports recent genital herpes Dx.

## 2021-04-26 LAB
CMV DNA SPEC QL NAA+PROBE: NOT DETECTED IU/ML
COPPER SERPL-MCNC: 110 UG/DL
EBV DNA SERPL NAA+PROBE-ACNC: NOT DETECTED IU/ML
HCV RNA FLD QL NAA+PROBE: NEGATIVE
HERPES SIMPLEX 1 DNA: NOT DETECTED
HERPES SIMPLEX 2 DNA: NOT DETECTED
HERPES SIMPLEX SPECIMEN SOURCE: NORMAL
LKM AB SER QL IF: <20.1 UNITS
VARICELLA ZOSTER VIRUS (VZV), DNA PCR: NEGATIVE

## 2021-04-27 LAB
HSV1 IGM SER QL: NORMAL TITER
HSV2 AB FLD-ACNC: NORMAL TITER
SOLUBLE LIVER IGG SER IA-ACNC: 1

## 2021-04-28 LAB — HEPATITIS E IGM ABY: NORMAL

## 2021-04-29 LAB — HEV AB SER QL: NEGATIVE

## 2021-05-03 ENCOUNTER — APPOINTMENT (OUTPATIENT)
Dept: ENDOCRINOLOGY | Facility: CLINIC | Age: 41
End: 2021-05-03

## 2021-05-05 ENCOUNTER — TRANSCRIPTION ENCOUNTER (OUTPATIENT)
Age: 41
End: 2021-05-05

## 2021-05-05 ENCOUNTER — APPOINTMENT (OUTPATIENT)
Dept: HEPATOLOGY | Facility: CLINIC | Age: 41
End: 2021-05-05
Payer: COMMERCIAL

## 2021-05-05 VITALS
SYSTOLIC BLOOD PRESSURE: 105 MMHG | RESPIRATION RATE: 16 BRPM | DIASTOLIC BLOOD PRESSURE: 70 MMHG | WEIGHT: 136 LBS | HEART RATE: 63 BPM | TEMPERATURE: 98.3 F | OXYGEN SATURATION: 100 % | HEIGHT: 63 IN | BODY MASS INDEX: 24.1 KG/M2

## 2021-05-05 DIAGNOSIS — R76.8 OTHER SPECIFIED ABNORMAL IMMUNOLOGICAL FINDINGS IN SERUM: ICD-10-CM

## 2021-05-05 PROCEDURE — 99214 OFFICE O/P EST MOD 30 MIN: CPT

## 2021-05-05 PROCEDURE — 99072 ADDL SUPL MATRL&STAF TM PHE: CPT

## 2021-05-06 LAB
ALBUMIN SERPL ELPH-MCNC: 4.4 G/DL
ALP BLD-CCNC: 61 U/L
ALT SERPL-CCNC: 42 U/L
AST SERPL-CCNC: 37 U/L
BILIRUB DIRECT SERPL-MCNC: 0.2 MG/DL
BILIRUB INDIRECT SERPL-MCNC: 0.3 MG/DL
BILIRUB SERPL-MCNC: 0.5 MG/DL
CK SERPL-CCNC: 67 U/L
COVID-19 NUCLEOCAPSID  GAM ANTIBODY INTERPRETATION: NEGATIVE
COVID-19 SPIKE DOMAIN ANTIBODY INTERPRETATION: NEGATIVE
GGT SERPL-CCNC: 12 U/L
PROT SERPL-MCNC: 6.5 G/DL
SARS-COV-2 AB SERPL IA-ACNC: 0.4 U/ML
SARS-COV-2 AB SERPL QL IA: 0.08 INDEX

## 2021-05-07 PROBLEM — R76.8 ANA POSITIVE: Status: ACTIVE | Noted: 2019-10-21

## 2021-05-07 NOTE — HISTORY OF PRESENT ILLNESS
[de-identified] : 39 yo Female with Hx of IDDM (using insulin pump), prior hospitalizations for DKA, vitamin D deficiency, recent COVID-19 exposure, was referred for abnormal liver enzymes. Was seen for initial visit 4/21/2021, and is here for follow up.\par \par She had normal liver enzymes in the past between 2016 and 8/2020. On 3/25/21 she was found to have moderately elevated transaminases ( and ), that was confirmed with a repeat test on 3/29/21. Cholestatic parameters were normal (ALP 72, Se bi 0.5), synthetic parameters normal (alb 4.9). GGT was normal. \par Liver workup based on labs from 3/2021: transferrin sat 22%, ferritin 10, HBsAG neg, HCV ab neg, Ceruloplasmin 24, IgG 1102, IgM 93, IgG 4 27, serum protein elfo; no monoclonal band, anti LKM neg, AMA neg, ASMA neg, HARINDER 1: 160, HbA1c 6.9%, Chol 178, LDL 1002, TG 47, COVID-19 nucelocapsid and spike Ab neg on 3/25/21, 25OH vit D 24.6\par \par US abd on 4/13/21 showed normal liver, normal biliary tract, and a small renal angiomyolipoma. \par \par Labs from 4/21/21: Past HSV, CMV, EBV infections, neg Hep A and B (not immune either), rest of liver workup neg. She was using minoxidil/tretinoin containing hair product, was stopped.\par \par Using 82 M (containing Minoxidil sulfate), started to use a year ago intermittently, last time yesterday, but before that a month ago. Denied other OTC meds, toxic habits, herbal supplements.\par Also reports recent genital herpes Dx.

## 2021-05-07 NOTE — ASSESSMENT
[FreeTextEntry1] : 41 yo Female with Hx of IDDM (using insulin pump), prior hospitalizations for DKA, vitamin D deficiency, recent COVID-19 exposure, was referred for acute, moderate rise of transaminases, with normal cholestatic parameters and normal synthetic function, with mild HARINDER positivity (1:160).\par \par Liver enzymes improving, will repeat, transient elevation?\par Discussed that viral or toxic factors can cause transient elevation. Hx of COVID exposure, but patient reportedly tested negative. Doc and nucleocapside Ab are neg. Patient has not been vaccinated yet.\par Patient is with Type I DM, and pos HARINDER 1:160, thus AIH was also in differential, but repeat AI workup neg. US abd was neg. \par Ordered FibroScan. \par Patient also reports recent Herpes Dx, discussed that while can cause liver abnormalities, usually would be higher, PCR neg, potential effect of treatment? (not using anymore)\par She has been using 82 M which contains minoxidil and tretinoin, on scalp. Advised to hold off with it. Discussed that hepatotoxicity has been reported even with topical use. \par \par RTC 2 months (to call our office to discuss blood work and FibroScan meantime)

## 2021-05-07 NOTE — PHYSICAL EXAM
[Non-Tender] : non-tender [General Appearance - Alert] : alert [General Appearance - In No Acute Distress] : in no acute distress [General Appearance - Well Nourished] : well nourished [General Appearance - Well Developed] : well developed [Sclera] : the sclera and conjunctiva were normal [Oropharynx] : the oropharynx was normal [Neck Appearance] : the appearance of the neck was normal [Respiration, Rhythm And Depth] : normal respiratory rhythm and effort [Exaggerated Use Of Accessory Muscles For Inspiration] : no accessory muscle use [Auscultation Breath Sounds / Voice Sounds] : lungs were clear to auscultation bilaterally [Heart Rate And Rhythm] : heart rate was normal and rhythm regular [Heart Sounds] : normal S1 and S2 [Edema] : there was no peripheral edema [Bowel Sounds] : normal bowel sounds [Abdomen Soft] : soft [Abdomen Tenderness] : non-tender [] : no hepato-splenomegaly [Cervical Lymph Nodes Enlarged Posterior Bilaterally] : posterior cervical [Cervical Lymph Nodes Enlarged Anterior Bilaterally] : anterior cervical [Supraclavicular Lymph Nodes Enlarged Bilaterally] : supraclavicular [Axillary Lymph Nodes Enlarged Bilaterally] : axillary [Inguinal Lymph Nodes Enlarged Bilaterally] : inguinal [No CVA Tenderness] : no ~M costovertebral angle tenderness [No Spinal Tenderness] : no spinal tenderness [Abnormal Walk] : normal gait [Skin Color & Pigmentation] : normal skin color and pigmentation [Skin Turgor] : normal skin turgor [Oriented To Time, Place, And Person] : oriented to person, place, and time [Impaired Insight] : insight and judgment were intact [Affect] : the affect was normal [Mood] : the mood was normal [Scleral Icterus] : No Scleral Icterus [Spider Angioma] : No spider angioma(s) were observed [Abdominal  Ascites] : no ascites [Liver Palpable ___ Finger Breadths Below Costal Margin] : was not palpable below costal margin [Asterixis] : no asterixis observed [Jaundice] : No jaundice [Palmar Erythema] : no Palmar Erythema [Depression] : no depression [FreeTextEntry1] : Grossly intact

## 2021-05-17 RX ORDER — LANCETS
EACH MISCELLANEOUS
Qty: 4 | Refills: 3 | Status: ACTIVE | COMMUNITY
Start: 2021-05-14 | End: 1900-01-01

## 2021-06-01 ENCOUNTER — APPOINTMENT (OUTPATIENT)
Dept: HEPATOLOGY | Facility: CLINIC | Age: 41
End: 2021-06-01

## 2021-06-05 RX ORDER — BLOOD SUGAR DIAGNOSTIC
STRIP MISCELLANEOUS
Qty: 100 | Refills: 0 | Status: ACTIVE | COMMUNITY
Start: 2021-05-14 | End: 1900-01-01

## 2021-06-14 ENCOUNTER — APPOINTMENT (OUTPATIENT)
Dept: HEPATOLOGY | Facility: CLINIC | Age: 41
End: 2021-06-14
Payer: COMMERCIAL

## 2021-06-14 DIAGNOSIS — R74.8 ABNORMAL LEVELS OF OTHER SERUM ENZYMES: ICD-10-CM

## 2021-06-14 DIAGNOSIS — R74.01 ELEVATION OF LEVELS OF LIVER TRANSAMINASE LEVELS: ICD-10-CM

## 2021-06-14 PROCEDURE — 99072 ADDL SUPL MATRL&STAF TM PHE: CPT

## 2021-06-14 PROCEDURE — 91200 LIVER ELASTOGRAPHY: CPT

## 2021-06-15 NOTE — DISCHARGE NOTE NURSING/CASE MANAGEMENT/SOCIAL WORK - NSPROEXTENSIONSOFSELF_GEN_A_NUR
none Home Suture Removal Text: Patient was provided a home suture removal kit and will remove their sutures at home.  If they have any questions or difficulties they will call the office.

## 2021-06-17 LAB
ALBUMIN SERPL ELPH-MCNC: 4.6 G/DL
ALP BLD-CCNC: 63 U/L
ALT SERPL-CCNC: 21 U/L
AST SERPL-CCNC: 21 U/L
BILIRUB DIRECT SERPL-MCNC: 0.2 MG/DL
BILIRUB INDIRECT SERPL-MCNC: 0.4 MG/DL
BILIRUB SERPL-MCNC: 0.6 MG/DL
PROT SERPL-MCNC: 6.6 G/DL

## 2021-08-12 LAB
ALBUMIN SERPL ELPH-MCNC: 4.6 G/DL
ALP BLD-CCNC: 59 U/L
ALT SERPL-CCNC: 19 U/L
AST SERPL-CCNC: 23 U/L
BILIRUB DIRECT SERPL-MCNC: 0.1 MG/DL
BILIRUB INDIRECT SERPL-MCNC: 0.3 MG/DL
BILIRUB SERPL-MCNC: 0.4 MG/DL
GGT SERPL-CCNC: 9 U/L
PROT SERPL-MCNC: 6.4 G/DL

## 2021-09-08 LAB — SARS-COV-2 N GENE NPH QL NAA+PROBE: NOT DETECTED

## 2021-09-13 LAB — SARS-COV-2 N GENE NPH QL NAA+PROBE: NOT DETECTED

## 2021-09-22 ENCOUNTER — APPOINTMENT (OUTPATIENT)
Dept: ENDOCRINOLOGY | Facility: CLINIC | Age: 41
End: 2021-09-22
Payer: COMMERCIAL

## 2021-09-22 VITALS
BODY MASS INDEX: 23.21 KG/M2 | TEMPERATURE: 98.3 F | SYSTOLIC BLOOD PRESSURE: 90 MMHG | HEIGHT: 63 IN | OXYGEN SATURATION: 97 % | WEIGHT: 131 LBS | DIASTOLIC BLOOD PRESSURE: 60 MMHG | HEART RATE: 69 BPM

## 2021-09-22 PROCEDURE — 99214 OFFICE O/P EST MOD 30 MIN: CPT

## 2021-09-22 PROCEDURE — 82962 GLUCOSE BLOOD TEST: CPT

## 2021-09-22 PROCEDURE — 83036 HEMOGLOBIN GLYCOSYLATED A1C: CPT | Mod: QW

## 2021-09-22 NOTE — ASSESSMENT
[FreeTextEntry1] : 40 year old female with history of DM Type 1 here for follow up. \par She is currently managed on the Omnipod and DEXCOM G6. \par HgA1C of 6.9%. \par She has been having increased hyperglycemia due to increased snacking. \par \par -Will changing carb ratio at 6 pm to 1:12 ( previously 1:13) to assist with post meal hyperglycemia after dinner \par -Uptodate with optho\par -SMBGs as appropriate \par -Low carb consistent diet\par -Check BMP, lipid panel, microalb/cr ratio \par \par -Follow up in 4-5 months. \par \par

## 2021-09-22 NOTE — HISTORY OF PRESENT ILLNESS
[FreeTextEntry1] : Diabetes Follow-up Patient HPI\par \par \par Just recently on Monday at [facility] ( DKA) and she was released in one day \par \par \par \par HPI:\par \par Duration of Diabetes: 8 years \par Is patient on Insulin? Yes, 8 years \par If yes, how long on insulin? Insulin pump for 4 years ( omnipod with G5) \par \par List Current Medications for Glycemic control and the doses:\par 1- Insulin pump ( Omnipod with DEXCOM G6) \par 2- \par 3- \par \par SMBG (self monitored blood glucose) readings: \par - Name of glucometer: \par - How often does the patient check BG? \par - Does the patient keep a log? \par \par If detailed record is available, what is the range of most of the BG readings?\par - Before Breakfast:\par - Before Lunch:100-140\par - Before Dinner:140-150\par - Before Bedtime: 200s\par \par \par Does patient get Hypoglycemic episodes? None at all ( Only during her menstrual cycle) \par \par \par Diabetic Complications: Is patient aware of having any of those complications?\par - Eyes: Retinopathy? None \par  	When was the last fully dilated eye exam?02/2021\par - Feet: 	Neuropathy? None \par  	Foot Ulcers? None \par 	When was the last time patient saw a Podiatrist? None \par - Kidneys: Nephropathy? GFr of 92, microalb/cr: 37 \par \par \par Diet: review patient's diet: \par Breakfast: Bread with peanut butter \par Lunch: Salad, turkey, bread \par Dinner: Salad\par Snacks: Potato chips, pretzels \par Juice or soda: None \par Desserts: None \par \par Increased snacking \par \par Exercise: review patient exercise habits: \par Walking x 3 times per week \par \par Symptoms: Write any symptoms, concerns and issues bothering the patient which have not be addressed above: \par No blurry vision, thirst or urination \par \par

## 2021-09-24 LAB
ALBUMIN SERPL ELPH-MCNC: 4.6 G/DL
ALP BLD-CCNC: 64 U/L
ALT SERPL-CCNC: 17 U/L
ANION GAP SERPL CALC-SCNC: 12 MMOL/L
AST SERPL-CCNC: 19 U/L
BILIRUB SERPL-MCNC: 0.5 MG/DL
BUN SERPL-MCNC: 18 MG/DL
CALCIUM SERPL-MCNC: 9.3 MG/DL
CHLORIDE SERPL-SCNC: 103 MMOL/L
CHOLEST SERPL-MCNC: 163 MG/DL
CO2 SERPL-SCNC: 22 MMOL/L
CREAT SERPL-MCNC: 0.67 MG/DL
CREAT SPEC-SCNC: 30 MG/DL
ESTIMATED AVERAGE GLUCOSE: 160 MG/DL
GLUCOSE BLDC GLUCOMTR-MCNC: 119
GLUCOSE SERPL-MCNC: 113 MG/DL
HBA1C MFR BLD HPLC: 6.9
HBA1C MFR BLD HPLC: 7.2 %
HDLC SERPL-MCNC: 73 MG/DL
LDLC SERPL CALC-MCNC: 80 MG/DL
MICROALBUMIN 24H UR DL<=1MG/L-MCNC: <1.2 MG/DL
MICROALBUMIN/CREAT 24H UR-RTO: NORMAL MG/G
NONHDLC SERPL-MCNC: 90 MG/DL
POTASSIUM SERPL-SCNC: 4.3 MMOL/L
PROT SERPL-MCNC: 6.8 G/DL
SODIUM SERPL-SCNC: 137 MMOL/L
TRIGL SERPL-MCNC: 52 MG/DL

## 2021-11-08 LAB — SARS-COV-2 N GENE NPH QL NAA+PROBE: NOT DETECTED

## 2021-11-12 DIAGNOSIS — Z03.818 ENCOUNTER FOR OBSERVATION FOR SUSPECTED EXPOSURE TO OTHER BIOLOGICAL AGENTS RULED OUT: ICD-10-CM

## 2021-11-15 LAB — SARS-COV-2 N GENE NPH QL NAA+PROBE: NOT DETECTED

## 2021-11-22 LAB — SARS-COV-2 N GENE NPH QL NAA+PROBE: NOT DETECTED

## 2021-11-26 ENCOUNTER — RESULT REVIEW (OUTPATIENT)
Age: 41
End: 2021-11-26

## 2021-11-26 ENCOUNTER — APPOINTMENT (OUTPATIENT)
Dept: ULTRASOUND IMAGING | Facility: IMAGING CENTER | Age: 41
End: 2021-11-26
Payer: COMMERCIAL

## 2021-11-26 ENCOUNTER — OUTPATIENT (OUTPATIENT)
Dept: OUTPATIENT SERVICES | Facility: HOSPITAL | Age: 41
LOS: 1 days | End: 2021-11-26
Payer: COMMERCIAL

## 2021-11-26 ENCOUNTER — APPOINTMENT (OUTPATIENT)
Dept: MAMMOGRAPHY | Facility: IMAGING CENTER | Age: 41
End: 2021-11-26
Payer: COMMERCIAL

## 2021-11-26 DIAGNOSIS — Z00.00 ENCOUNTER FOR GENERAL ADULT MEDICAL EXAMINATION WITHOUT ABNORMAL FINDINGS: ICD-10-CM

## 2021-11-26 DIAGNOSIS — Z00.8 ENCOUNTER FOR OTHER GENERAL EXAMINATION: ICD-10-CM

## 2021-11-26 PROCEDURE — 77063 BREAST TOMOSYNTHESIS BI: CPT

## 2021-11-26 PROCEDURE — 76641 ULTRASOUND BREAST COMPLETE: CPT

## 2021-11-26 PROCEDURE — 76641 ULTRASOUND BREAST COMPLETE: CPT | Mod: 26,50

## 2021-11-26 PROCEDURE — 77067 SCR MAMMO BI INCL CAD: CPT | Mod: 26

## 2021-11-26 PROCEDURE — 77067 SCR MAMMO BI INCL CAD: CPT

## 2021-11-26 PROCEDURE — 77063 BREAST TOMOSYNTHESIS BI: CPT | Mod: 26

## 2021-12-06 LAB — SARS-COV-2 N GENE NPH QL NAA+PROBE: NOT DETECTED

## 2022-01-21 ENCOUNTER — APPOINTMENT (OUTPATIENT)
Dept: ENDOCRINOLOGY | Facility: CLINIC | Age: 42
End: 2022-01-21

## 2022-01-24 ENCOUNTER — RESULT REVIEW (OUTPATIENT)
Age: 42
End: 2022-01-24

## 2022-02-01 ENCOUNTER — NON-APPOINTMENT (OUTPATIENT)
Age: 42
End: 2022-02-01

## 2022-02-10 ENCOUNTER — APPOINTMENT (OUTPATIENT)
Dept: INTERNAL MEDICINE | Facility: CLINIC | Age: 42
End: 2022-02-10
Payer: COMMERCIAL

## 2022-02-10 VITALS
DIASTOLIC BLOOD PRESSURE: 64 MMHG | HEART RATE: 60 BPM | WEIGHT: 140 LBS | RESPIRATION RATE: 14 BRPM | SYSTOLIC BLOOD PRESSURE: 92 MMHG | BODY MASS INDEX: 24.8 KG/M2

## 2022-02-10 DIAGNOSIS — Z11.59 ENCOUNTER FOR SCREENING FOR OTHER VIRAL DISEASES: ICD-10-CM

## 2022-02-10 DIAGNOSIS — L65.9 NONSCARRING HAIR LOSS, UNSPECIFIED: ICD-10-CM

## 2022-02-10 DIAGNOSIS — D17.9 BENIGN LIPOMATOUS NEOPLASM, UNSPECIFIED: ICD-10-CM

## 2022-02-10 PROCEDURE — 99396 PREV VISIT EST AGE 40-64: CPT

## 2022-02-10 PROCEDURE — G0442 ANNUAL ALCOHOL SCREEN 15 MIN: CPT

## 2022-02-10 PROCEDURE — G0444 DEPRESSION SCREEN ANNUAL: CPT | Mod: 59

## 2022-02-10 NOTE — HEALTH RISK ASSESSMENT
[Audit-CScore] : 2 [[id number]] : 0 [No Retinopathy] : No retinopathy [EyeExamDate] : 12/1/2021 [Change in mental status noted] : No change in mental status noted [Language] : denies difficulty with language [Behavior] : denies difficulty with behavior [Learning/Retaining New Information] : denies difficulty learning/retaining new information [Handling Complex Tasks] : denies difficulty handling complex tasks [Reasoning] : denies difficulty with reasoning [Spatial Ability and Orientation] : denies difficulty with spatial ability and orientation [Reports changes in hearing] : Reports no changes in hearing [Reports changes in vision] : Reports no changes in vision [Reports changes in dental health] : Reports no changes in dental health

## 2022-02-10 NOTE — ASSESSMENT
[FreeTextEntry1] : Ms. ORTIZ is eligible for influenza  and COVID vaccinations.  Rationale, risks and benefits explained in detail.  She declines at this time.\par

## 2022-02-10 NOTE — HISTORY OF PRESENT ILLNESS
[FreeTextEntry1] : Ms. ORTIZ is here for an annual physical examination and assessment.\par  [de-identified] : She generally feels well with no specific complaints. Her recent health has been good.\par Has been noting some hair loss. \par \par Denies headache, dizziness.\par Denies rash, fatigue, fever, weight loss, anorexia.\par Denies cough, wheezing.\par Denies CP, SOB, BYRD, edema, palpitations.\par Denies abdominal pain, N/V/D/C. Denies BRBPR, melena, dysphagia.\par Denies dysuria, frequency, hematuria, hesitancy.\par Denies weakness, numbness, gait instability.\par \par

## 2022-02-14 ENCOUNTER — NON-APPOINTMENT (OUTPATIENT)
Age: 42
End: 2022-02-14

## 2022-02-16 LAB
25(OH)D3 SERPL-MCNC: 32.8 NG/ML
ALBUMIN SERPL ELPH-MCNC: 4.7 G/DL
ALP BLD-CCNC: 61 U/L
ALT SERPL-CCNC: 10 U/L
ANA SER IF-ACNC: NEGATIVE
ANION GAP SERPL CALC-SCNC: 16 MMOL/L
AST SERPL-CCNC: 18 U/L
BASOPHILS # BLD AUTO: 0.06 K/UL
BASOPHILS NFR BLD AUTO: 0.8 %
BILIRUB SERPL-MCNC: 0.6 MG/DL
BUN SERPL-MCNC: 18 MG/DL
CALCIUM SERPL-MCNC: 9.7 MG/DL
CHLORIDE SERPL-SCNC: 99 MMOL/L
CHOLEST SERPL-MCNC: 173 MG/DL
CO2 SERPL-SCNC: 23 MMOL/L
COVID-19 SPIKE DOMAIN ANTIBODY INTERPRETATION: POSITIVE
CREAT SERPL-MCNC: 0.63 MG/DL
CREAT SPEC-SCNC: 75 MG/DL
EOSINOPHIL # BLD AUTO: 0.06 K/UL
EOSINOPHIL NFR BLD AUTO: 0.8 %
ESTIMATED AVERAGE GLUCOSE: 148 MG/DL
FERRITIN SERPL-MCNC: 16 NG/ML
GLUCOSE SERPL-MCNC: 97 MG/DL
HBA1C MFR BLD HPLC: 6.8 %
HCT VFR BLD CALC: 37.7 %
HDLC SERPL-MCNC: 72 MG/DL
HGB BLD-MCNC: 12.2 G/DL
IMM GRANULOCYTES NFR BLD AUTO: 0.1 %
IRON SATN MFR SERPL: 33 %
IRON SERPL-MCNC: 104 UG/DL
LDLC SERPL CALC-MCNC: 88 MG/DL
LDLC SERPL DIRECT ASSAY-MCNC: 91 MG/DL
LYMPHOCYTES # BLD AUTO: 1.62 K/UL
LYMPHOCYTES NFR BLD AUTO: 22.6 %
MAN DIFF?: NORMAL
MCHC RBC-ENTMCNC: 28.4 PG
MCHC RBC-ENTMCNC: 32.4 GM/DL
MCV RBC AUTO: 87.7 FL
MICROALBUMIN 24H UR DL<=1MG/L-MCNC: <1.2 MG/DL
MICROALBUMIN/CREAT 24H UR-RTO: NORMAL MG/G
MONOCYTES # BLD AUTO: 0.51 K/UL
MONOCYTES NFR BLD AUTO: 7.1 %
NEUTROPHILS # BLD AUTO: 4.92 K/UL
NEUTROPHILS NFR BLD AUTO: 68.6 %
NONHDLC SERPL-MCNC: 102 MG/DL
PLATELET # BLD AUTO: 222 K/UL
POTASSIUM SERPL-SCNC: 4.1 MMOL/L
PROLACTIN SERPL-MCNC: 22.2 NG/ML
PROT SERPL-MCNC: 6.6 G/DL
RBC # BLD: 4.3 M/UL
RBC # FLD: 13.2 %
SARS-COV-2 AB SERPL IA-ACNC: 2 U/ML
SODIUM SERPL-SCNC: 138 MMOL/L
TESTOST SERPL-MCNC: 33.1 NG/DL
TIBC SERPL-MCNC: 312 UG/DL
TRIGL SERPL-MCNC: 67 MG/DL
TSH SERPL-ACNC: 1.45 UIU/ML
UIBC SERPL-MCNC: 208 UG/DL
WBC # FLD AUTO: 7.18 K/UL
ZINC SERPL-MCNC: 58 UG/DL

## 2022-03-09 ENCOUNTER — APPOINTMENT (OUTPATIENT)
Dept: ENDOCRINOLOGY | Facility: CLINIC | Age: 42
End: 2022-03-09
Payer: COMMERCIAL

## 2022-03-09 VITALS
OXYGEN SATURATION: 98 % | BODY MASS INDEX: 23.92 KG/M2 | HEIGHT: 63 IN | WEIGHT: 135 LBS | TEMPERATURE: 98.3 F | HEART RATE: 83 BPM | SYSTOLIC BLOOD PRESSURE: 100 MMHG | DIASTOLIC BLOOD PRESSURE: 80 MMHG

## 2022-03-09 PROCEDURE — 99214 OFFICE O/P EST MOD 30 MIN: CPT

## 2022-03-09 RX ORDER — INSULIN LISPRO 100 [IU]/ML
100 INJECTION, SOLUTION INTRAVENOUS; SUBCUTANEOUS
Qty: 5 | Refills: 5 | Status: ACTIVE | COMMUNITY
Start: 2017-08-15 | End: 1900-01-01

## 2022-03-09 NOTE — HISTORY OF PRESENT ILLNESS
[FreeTextEntry1] : Diabetes Follow-up Patient HPI\par \par \par Hospitalized at [facility] ( DKA) and she was released in one day during September 2021.\par COVID infection in December 2021.\par \par \par HPI:\par \par Duration of Diabetes: 8 years \par Is patient on Insulin? Yes, 8 years \par If yes, how long on insulin? Insulin pump for 4 years ( omnipod with G5) \par \par List Current Medications for Glycemic control and the doses:\par 1- Insulin pump ( Omnipod with DEXCOM G6) \par 2- \par 3- \par \par SMBG (self monitored blood glucose) readings: \par - Name of glucometer: \par - How often does the patient check BG? \par - Does the patient keep a log? \par \par If detailed record is available, what is the range of most of the BG readings?\par - Before Breakfast:\par - Before Lunch:100-150\par - Before Dinner:130-200\par - Before Bedtime: 200s\par Often having post-prandial hyperglycemia from 2pm-6pm. Also hyperglycemia before bedtime, likely after snacking. \par \par Does patient get Hypoglycemic episodes? ( Only during her menstrual cycle- overnight) \par Also reports mild fasting hypoglycemia if she doesn't have a snack in the evening. \par Hypoglycemic symptoms: shakiness, palpitations- will drink juice to correct\par \par Diabetic Complications: Is patient aware of having any of those complications?\par - Eyes: Retinopathy? None \par  	When was the last fully dilated eye exam? scheduled for next week\par - Feet: 	Neuropathy? None \par  	Foot Ulcers? None \par 	When was the last time patient saw a Podiatrist? None \par - Kidneys: Nephropathy? GFr of 110, microalb/cr: negative (2/2022) \par \par \par Diet: review patient's diet: \par Breakfast (8am): Bread with peanut butter or egg\par Lunch (12pm): Salad, turkey, bread \par Dinner (6pm): Salad, meat\par Snacks: Potato chips, pretzels after dinner \par Juice or soda: None \par Desserts: None \par \par Increased snacking unchanged from last visit.\par \par Exercise: review patient exercise habits: \par Walking x 3 times per week \par \par Symptoms: Write any symptoms, concerns and issues bothering the patient which have not be addressed above: \par No blurry vision, thirst or urination \par \par

## 2022-03-09 NOTE — ASSESSMENT
[FreeTextEntry1] : 41 year old female with history of DM Type 1 here for follow up. \par She is currently managed on the Omnipod and DEXCOM G6. \par HgA1C of 6.8%, well-controlled. \par She has been having increased hyperglycemia due to increased snacking. \par \par -Dexcom downloaded and reviewed.\par -Will change carb ratio from 12am-2pm to 1:11 ( previously 1:12) to assist with post meal hyperglycemia after lunch. ICR from 2pm-12am will remain at 1:12.\par -Has appt scheduled with optho next week\par -SMBGs as appropriate \par -Low carb consistent diet\par -BMP, lipid panel, microalb/cr ratio wnl (2/2022)\par -Has glucagon kit, long-acting insulin.\par -Need humalog refill.\par \par -Follow up in 6 months. \par \par Seen and d/w Dr. Urban.\par Rivka Ross NP (Brenda)  Endocrine Fellow

## 2022-03-09 NOTE — PHYSICAL EXAM
[Alert] : alert [Well Nourished] : well nourished [No Acute Distress] : no acute distress [Normal Sclera/Conjunctiva] : normal sclera/conjunctiva [EOMI] : extra ocular movement intact [PERRL] : pupils equal, round and reactive to light [No Neck Mass] : no neck mass was observed [Thyroid Not Enlarged] : the thyroid was not enlarged [No Respiratory Distress] : no respiratory distress [Clear to Auscultation] : lungs were clear to auscultation bilaterally [Normal S1, S2] : normal S1 and S2 [Normal Rate] : heart rate was normal [Regular Rhythm] : with a regular rhythm [Normal Bowel Sounds] : normal bowel sounds [Not Tender] : non-tender [Soft] : abdomen soft [Normal Gait] : normal gait [No Clubbing, Cyanosis] : no clubbing  or cyanosis of the fingernails [No Joint Swelling] : no joint swelling seen [Normal Strength/Tone] : muscle strength and tone were normal [No Rash] : no rash [No Motor Deficits] : the motor exam was normal [Normal Reflexes] : deep tendon reflexes were 2+ and symmetric [No Tremors] : no tremors [Oriented x3] : oriented to person, place, and time [Normal Affect] : the affect was normal [Normal Insight/Judgement] : insight and judgment were intact [Normal Mood] : the mood was normal [No Edema] : no peripheral edema [Right foot was examined, including] : right foot ~C was examined, including visual inspection with sensory and pulse exams [Left foot was examined, including] : left foot ~C was examined, including visual inspection with sensory and pulse exams [Normal] : normal [Diminished Throughout Both Feet] : normal tactile sensation with monofilament testing throughout both feet

## 2022-03-16 ENCOUNTER — APPOINTMENT (OUTPATIENT)
Dept: OPHTHALMOLOGY | Facility: CLINIC | Age: 42
End: 2022-03-16

## 2022-03-18 RX ORDER — INSULIN PUMP CONTROLLER
EACH MISCELLANEOUS
Qty: 9 | Refills: 3 | Status: ACTIVE | COMMUNITY
Start: 2022-03-18 | End: 1900-01-01

## 2022-04-11 PROBLEM — Z11.59 SCREENING FOR VIRAL DISEASE: Status: RESOLVED | Noted: 2022-02-10 | Resolved: 2022-02-12

## 2022-04-29 ENCOUNTER — OUTPATIENT (OUTPATIENT)
Dept: OUTPATIENT SERVICES | Facility: HOSPITAL | Age: 42
LOS: 1 days | End: 2022-04-29
Payer: COMMERCIAL

## 2022-04-29 ENCOUNTER — APPOINTMENT (OUTPATIENT)
Dept: ULTRASOUND IMAGING | Facility: CLINIC | Age: 42
End: 2022-04-29
Payer: COMMERCIAL

## 2022-04-29 DIAGNOSIS — D17.9 BENIGN LIPOMATOUS NEOPLASM, UNSPECIFIED: ICD-10-CM

## 2022-04-29 PROCEDURE — 76775 US EXAM ABDO BACK WALL LIM: CPT | Mod: 26

## 2022-04-29 PROCEDURE — 76775 US EXAM ABDO BACK WALL LIM: CPT

## 2022-11-04 RX ORDER — INSULIN PMP CART,AUT,G6/7,CNTR
EACH SUBCUTANEOUS
Qty: 1 | Refills: 0 | Status: ACTIVE | COMMUNITY
Start: 2022-11-04 | End: 1900-01-01

## 2022-12-13 ENCOUNTER — NON-APPOINTMENT (OUTPATIENT)
Age: 42
End: 2022-12-13

## 2022-12-13 ENCOUNTER — APPOINTMENT (OUTPATIENT)
Dept: OPHTHALMOLOGY | Facility: CLINIC | Age: 42
End: 2022-12-13

## 2022-12-13 PROCEDURE — 92020 GONIOSCOPY: CPT

## 2022-12-13 PROCEDURE — 92014 COMPRE OPH EXAM EST PT 1/>: CPT

## 2022-12-16 ENCOUNTER — APPOINTMENT (OUTPATIENT)
Dept: ENDOCRINOLOGY | Facility: CLINIC | Age: 42
End: 2022-12-16

## 2022-12-16 VITALS
SYSTOLIC BLOOD PRESSURE: 110 MMHG | DIASTOLIC BLOOD PRESSURE: 70 MMHG | HEART RATE: 67 BPM | HEIGHT: 63 IN | BODY MASS INDEX: 24.1 KG/M2 | OXYGEN SATURATION: 98 % | WEIGHT: 136 LBS

## 2022-12-16 DIAGNOSIS — H66.90 OTITIS MEDIA, UNSPECIFIED, UNSPECIFIED EAR: ICD-10-CM

## 2022-12-16 LAB — HBA1C MFR BLD HPLC: 6.4

## 2022-12-16 PROCEDURE — 83036 HEMOGLOBIN GLYCOSYLATED A1C: CPT | Mod: QW

## 2022-12-16 PROCEDURE — 99214 OFFICE O/P EST MOD 30 MIN: CPT | Mod: 25

## 2022-12-16 NOTE — HISTORY OF PRESENT ILLNESS
[FreeTextEntry1] : Diabetes Follow-up Patient HPI\par \par \par Just recently on Monday at [facility] ( DKA) and she was released in one day \par \par \par \par HPI:\par \par Duration of Diabetes: 8 years \par Is patient on Insulin? Yes, 8 years \par If yes, how long on insulin? Insulin pump for 4 years ( omnipod with G5) \par \par List Current Medications for Glycemic control and the doses:\par 1- Insulin pump ( Omnipod with DEXCOM G6) \par 2- \par 3- \par \par \par \par 12-6 am : 0.7 units   IC ratio: 1: 15\par 6 pm- 12 am: 0.75 units    IC ratio: 1: 12\par \par ISF : 50\par \par \par \par SMBG (self monitored blood glucose) readings: \par - Name of glucometer: \par - How often does the patient check BG? \par - Does the patient keep a log? \par \par If detailed record is available, what is the range of most of the BG readings?\par - Before Breakfast:140s\par - Before Lunch:100-120\par - Before Dinner:200s\par - Before Bedtime: 200s\par \par \par Does patient get Hypoglycemic episodes? None at all\par \par \par Diabetic Complications: Is patient aware of having any of those complications?\par - Eyes: Retinopathy? None \par  	When was the last fully dilated eye exam?12/2022\par - Feet: 	Neuropathy? None \par  	Foot Ulcers? None \par 	When was the last time patient saw a Podiatrist? None \par - Kidneys: Nephropathy? GFr of 92, microalb/cr: 37 \par \par \par Diet: review patient's diet: \par Breakfast: Bread with peanut butter \par Lunch: Salad, turkey, bread \par Dinner: Salad\par Snacks: Potato chips, pretzels \par Juice or soda: None \par Desserts: None \par \par Increased snacking \par \par Exercise: review patient exercise habits: \par Walking x 3 times per week \par \par Symptoms: Write any symptoms, concerns and issues bothering the patient which have not be addressed above: \par No blurry vision, thirst or urination \par \par

## 2022-12-16 NOTE — ASSESSMENT
[FreeTextEntry1] : 42 year old female with history of DM Type 1 here for follow up. \par She is currently managed on the Omnipod and DEXCOM G6. \par HgA1C of 6.4%, well-controlled. \par She has been having increased hyperglycemia due to increased snacking. \par \par -Dexcom downloaded and reviewed.\par -Will change basal rate of 6pm- 12 am to 0.8  units/hr since BG has been high unrelated to meals \par -SMBGs as appropriate \par -Low carb consistent diet\par -BMP, lipid panel, microalb/cr ratio\par -Has glucagon kit, long-acting insulin.\par \par Ear infection\par -Erythematous on exam\par -Ofloxacin drop ordered\par -Advised to follow up with PCP if no improvement in 1-2 days\par \par \par -Follow up in 6 months. \par \par Seen and d/w Dr. Urban.\par Rivka Acevedo "Yamilet Ross NP  Endocrine Fellow

## 2022-12-17 NOTE — PHYSICAL EXAM
[de-identified] : WDWN in NAD\par HEENT:  unremarkable\par Neck:  supple, no JVD, no LN\par Lungs:  CTA B/L, no W/R/R\par Heart:  Reg rate, +S1S2, no M/R/G\par Abdomen:  soft, NT, ND, +BS, no masses, no HS-megaly\par Genital: No pubic or genital lesions noted.\par Ext:  no C/C/E\par Neuro:  no focal deficits

## 2022-12-17 NOTE — HISTORY OF PRESENT ILLNESS
[FreeTextEntry8] : ABDIRIZAK ORTIZ  is a 42 year old female  with history of T1DM, insulin pump status, HARINDER positive, angiomyolipomas  presented today for possible Rt ear infection.\par

## 2022-12-20 ENCOUNTER — APPOINTMENT (OUTPATIENT)
Dept: INTERNAL MEDICINE | Facility: CLINIC | Age: 42
End: 2022-12-20

## 2023-01-06 ENCOUNTER — NON-APPOINTMENT (OUTPATIENT)
Age: 43
End: 2023-01-06

## 2023-01-06 ENCOUNTER — APPOINTMENT (OUTPATIENT)
Dept: OPHTHALMOLOGY | Facility: CLINIC | Age: 43
End: 2023-01-06
Payer: COMMERCIAL

## 2023-01-06 PROCEDURE — 92310C: CUSTOM

## 2023-01-06 PROCEDURE — 99199 UNLISTED SPECIAL SVC PX/RPRT: CPT | Mod: NC

## 2023-01-23 LAB
ALBUMIN SERPL ELPH-MCNC: 4.4 G/DL
ALP BLD-CCNC: 63 U/L
ALT SERPL-CCNC: 11 U/L
ANION GAP SERPL CALC-SCNC: 12 MMOL/L
AST SERPL-CCNC: 16 U/L
BILIRUB SERPL-MCNC: 0.4 MG/DL
BUN SERPL-MCNC: 14 MG/DL
CALCIUM SERPL-MCNC: 9.3 MG/DL
CHLORIDE SERPL-SCNC: 105 MMOL/L
CHOLEST SERPL-MCNC: 196 MG/DL
CO2 SERPL-SCNC: 22 MMOL/L
CREAT SERPL-MCNC: 0.69 MG/DL
CREAT SPEC-SCNC: 75 MG/DL
EGFR: 110 ML/MIN/1.73M2
ESTIMATED AVERAGE GLUCOSE: 151 MG/DL
GLUCOSE SERPL-MCNC: 120 MG/DL
HBA1C MFR BLD HPLC: 6.9 %
HDLC SERPL-MCNC: 72 MG/DL
LDLC SERPL CALC-MCNC: 111 MG/DL
MICROALBUMIN 24H UR DL<=1MG/L-MCNC: <1.2 MG/DL
MICROALBUMIN/CREAT 24H UR-RTO: NORMAL MG/G
NONHDLC SERPL-MCNC: 124 MG/DL
POTASSIUM SERPL-SCNC: 4.1 MMOL/L
PROT SERPL-MCNC: 6.6 G/DL
SODIUM SERPL-SCNC: 139 MMOL/L
T4 FREE SERPL-MCNC: 1.3 NG/DL
TRIGL SERPL-MCNC: 62 MG/DL
TSH SERPL-ACNC: 1.71 UIU/ML

## 2023-03-01 ENCOUNTER — APPOINTMENT (OUTPATIENT)
Dept: INTERNAL MEDICINE | Facility: CLINIC | Age: 43
End: 2023-03-01
Payer: COMMERCIAL

## 2023-03-01 VITALS
WEIGHT: 145 LBS | SYSTOLIC BLOOD PRESSURE: 90 MMHG | DIASTOLIC BLOOD PRESSURE: 60 MMHG | HEART RATE: 64 BPM | BODY MASS INDEX: 25.69 KG/M2 | RESPIRATION RATE: 14 BRPM

## 2023-03-01 DIAGNOSIS — H60.90 UNSPECIFIED OTITIS EXTERNA, UNSPECIFIED EAR: ICD-10-CM

## 2023-03-01 PROCEDURE — 99396 PREV VISIT EST AGE 40-64: CPT

## 2023-03-01 RX ORDER — HYDROCORTISONE AND ACETIC ACID OTIC 20.75; 10.375 MG/ML; MG/ML
1-2 SOLUTION AURICULAR (OTIC)
Qty: 1 | Refills: 0 | Status: ACTIVE | COMMUNITY
Start: 2023-03-01 | End: 1900-01-01

## 2023-03-01 RX ORDER — OFLOXACIN OTIC 3 MG/ML
0.3 SOLUTION AURICULAR (OTIC) TWICE DAILY
Qty: 1 | Refills: 0 | Status: DISCONTINUED | COMMUNITY
Start: 2022-12-16 | End: 2023-03-01

## 2023-03-01 NOTE — PHYSICAL EXAM
[No Acute Distress] : no acute distress [Well Nourished] : well nourished [Well Developed] : well developed [Well-Appearing] : well-appearing [Normal Sclera/Conjunctiva] : normal sclera/conjunctiva [PERRL] : pupils equal round and reactive to light [EOMI] : extraocular movements intact [Normal Outer Ear/Nose] : the outer ears and nose were normal in appearance [Normal Oropharynx] : the oropharynx was normal [No JVD] : no jugular venous distention [No Lymphadenopathy] : no lymphadenopathy [Supple] : supple [Thyroid Normal, No Nodules] : the thyroid was normal and there were no nodules present [No Respiratory Distress] : no respiratory distress  [No Accessory Muscle Use] : no accessory muscle use [Clear to Auscultation] : lungs were clear to auscultation bilaterally [Normal Rate] : normal rate  [Regular Rhythm] : with a regular rhythm [Normal S1, S2] : normal S1 and S2 [No Murmur] : no murmur heard [No Carotid Bruits] : no carotid bruits [No Abdominal Bruit] : a ~M bruit was not heard ~T in the abdomen [No Varicosities] : no varicosities [Pedal Pulses Present] : the pedal pulses are present [No Edema] : there was no peripheral edema [No Palpable Aorta] : no palpable aorta [No Extremity Clubbing/Cyanosis] : no extremity clubbing/cyanosis [Soft] : abdomen soft [Non Tender] : non-tender [Non-distended] : non-distended [No Masses] : no abdominal mass palpated [No HSM] : no HSM [Normal Bowel Sounds] : normal bowel sounds [Normal Posterior Cervical Nodes] : no posterior cervical lymphadenopathy [Normal Anterior Cervical Nodes] : no anterior cervical lymphadenopathy [No CVA Tenderness] : no CVA  tenderness [No Spinal Tenderness] : no spinal tenderness [No Joint Swelling] : no joint swelling [Grossly Normal Strength/Tone] : grossly normal strength/tone [No Rash] : no rash [Coordination Grossly Intact] : coordination grossly intact [No Focal Deficits] : no focal deficits [Normal Gait] : normal gait [Deep Tendon Reflexes (DTR)] : deep tendon reflexes were 2+ and symmetric [Normal Affect] : the affect was normal [Normal Insight/Judgement] : insight and judgment were intact [de-identified] : EAC inflamed

## 2023-03-01 NOTE — HEALTH RISK ASSESSMENT
[Excellent] : ~his/her~  mood as  excellent [Yes] : Yes [Monthly or less (1 pt)] : Monthly or less (1 point) [3 or 4 (1 pt)] : 3 or 4  (1 point) [Never (0 pts)] : Never (0 points) [No] : In the past 12 months have you used drugs other than those required for medical reasons? No [No falls in past year] : Patient reported no falls in the past year [0] : 2) Feeling down, depressed, or hopeless: Not at all (0) [PHQ-2 Negative - No further assessment needed] : PHQ-2 Negative - No further assessment needed [None] : None [Alone] : lives alone [Employed] : employed [Single] : single [Feels Safe at Home] : Feels safe at home [Fully functional (bathing, dressing, toileting, transferring, walking, feeding)] : Fully functional (bathing, dressing, toileting, transferring, walking, feeding) [Fully functional (using the telephone, shopping, preparing meals, housekeeping, doing laundry, using] : Fully functional and needs no help or supervision to perform IADLs (using the telephone, shopping, preparing meals, housekeeping, doing laundry, using transportation, managing medications and managing finances) [Reports normal functional visual acuity (ie: able to read med bottle)] : Reports normal functional visual acuity [Smoke Detector] : smoke detector [Seat Belt] :  uses seat belt [Audit-CScore] : 2 [[id number]] : 0 [Change in mental status noted] : No change in mental status noted [Language] : denies difficulty with language [Behavior] : denies difficulty with behavior [Learning/Retaining New Information] : denies difficulty learning/retaining new information [Handling Complex Tasks] : denies difficulty handling complex tasks [Reasoning] : denies difficulty with reasoning [Spatial Ability and Orientation] : denies difficulty with spatial ability and orientation [Reports changes in hearing] : Reports no changes in hearing [Reports changes in vision] : Reports no changes in vision [Reports changes in dental health] : Reports no changes in dental health

## 2023-03-01 NOTE — HISTORY OF PRESENT ILLNESS
[FreeTextEntry1] : Ms. ORTIZ is here for an annual physical examination and assessment.\par  [de-identified] : She generally feels well with no specific complaints. Her recent health has been good.\par \par Denies headache, dizziness.\par Denies rash, fatigue, fever, weight loss, anorexia.\par Denies cough, wheezing.\par Denies CP, SOB, BYRD, edema, palpitations.\par Denies abdominal pain, N/V/D/C. Denies BRBPR, melena, dysphagia.\par Denies dysuria, frequency, hematuria, hesitancy.\par Denies weakness, numbness, gait instability.\par

## 2023-03-29 ENCOUNTER — OUTPATIENT (OUTPATIENT)
Dept: OUTPATIENT SERVICES | Facility: HOSPITAL | Age: 43
LOS: 1 days | End: 2023-03-29

## 2023-03-29 DIAGNOSIS — Z00.00 ENCOUNTER FOR GENERAL ADULT MEDICAL EXAMINATION WITHOUT ABNORMAL FINDINGS: ICD-10-CM

## 2023-04-27 ENCOUNTER — OUTPATIENT (OUTPATIENT)
Dept: OUTPATIENT SERVICES | Facility: HOSPITAL | Age: 43
LOS: 1 days | End: 2023-04-27
Payer: COMMERCIAL

## 2023-04-27 ENCOUNTER — APPOINTMENT (OUTPATIENT)
Dept: MAMMOGRAPHY | Facility: CLINIC | Age: 43
End: 2023-04-27
Payer: COMMERCIAL

## 2023-04-27 ENCOUNTER — RESULT REVIEW (OUTPATIENT)
Age: 43
End: 2023-04-27

## 2023-04-27 ENCOUNTER — APPOINTMENT (OUTPATIENT)
Dept: ULTRASOUND IMAGING | Facility: CLINIC | Age: 43
End: 2023-04-27
Payer: COMMERCIAL

## 2023-04-27 DIAGNOSIS — Z00.8 ENCOUNTER FOR OTHER GENERAL EXAMINATION: ICD-10-CM

## 2023-04-27 DIAGNOSIS — Z12.31 ENCOUNTER FOR SCREENING MAMMOGRAM FOR MALIGNANT NEOPLASM OF BREAST: ICD-10-CM

## 2023-04-27 PROCEDURE — 77067 SCR MAMMO BI INCL CAD: CPT | Mod: 26

## 2023-04-27 PROCEDURE — 77063 BREAST TOMOSYNTHESIS BI: CPT | Mod: 26

## 2023-04-27 PROCEDURE — 76775 US EXAM ABDO BACK WALL LIM: CPT | Mod: 26

## 2023-04-27 PROCEDURE — 77067 SCR MAMMO BI INCL CAD: CPT

## 2023-04-27 PROCEDURE — 77063 BREAST TOMOSYNTHESIS BI: CPT

## 2023-04-27 PROCEDURE — 76775 US EXAM ABDO BACK WALL LIM: CPT

## 2023-06-08 ENCOUNTER — APPOINTMENT (OUTPATIENT)
Dept: ORTHOPEDIC SURGERY | Facility: CLINIC | Age: 43
End: 2023-06-08
Payer: COMMERCIAL

## 2023-06-08 VITALS — HEIGHT: 62 IN | BODY MASS INDEX: 25.76 KG/M2 | WEIGHT: 140 LBS

## 2023-06-08 DIAGNOSIS — M54.2 CERVICALGIA: ICD-10-CM

## 2023-06-08 PROCEDURE — 99204 OFFICE O/P NEW MOD 45 MIN: CPT

## 2023-06-08 PROCEDURE — 72040 X-RAY EXAM NECK SPINE 2-3 VW: CPT

## 2023-06-08 RX ORDER — TIZANIDINE 4 MG/1
4 TABLET ORAL
Qty: 60 | Refills: 1 | Status: ACTIVE | COMMUNITY
Start: 2023-06-08 | End: 1900-01-01

## 2023-06-12 ENCOUNTER — APPOINTMENT (OUTPATIENT)
Dept: ENDOCRINOLOGY | Facility: CLINIC | Age: 43
End: 2023-06-12
Payer: COMMERCIAL

## 2023-06-12 VITALS
HEART RATE: 89 BPM | OXYGEN SATURATION: 98 % | BODY MASS INDEX: 24.45 KG/M2 | DIASTOLIC BLOOD PRESSURE: 72 MMHG | HEIGHT: 63 IN | WEIGHT: 138 LBS | SYSTOLIC BLOOD PRESSURE: 112 MMHG

## 2023-06-12 DIAGNOSIS — Z96.41 PRESENCE OF INSULIN PUMP (EXTERNAL) (INTERNAL): ICD-10-CM

## 2023-06-12 DIAGNOSIS — N95.1 MENOPAUSAL AND FEMALE CLIMACTERIC STATES: ICD-10-CM

## 2023-06-12 PROCEDURE — 99215 OFFICE O/P EST HI 40 MIN: CPT

## 2023-06-12 NOTE — REVIEW OF SYSTEMS
[Fatigue] : no fatigue [Decreased Appetite] : appetite not decreased [Recent Weight Gain (___ Lbs)] : no recent weight gain [Recent Weight Loss (___ Lbs)] : no recent weight loss [Visual Field Defect] : no visual field defect [Dry Eyes] : no dryness [Dysphagia] : no dysphagia [Neck Pain] : no neck pain [Dysphonia] : no dysphonia [Nasal Congestion] : no nasal congestion [Chest Pain] : no chest pain [Slow Heart Rate] : heart rate is not slow [Palpitations] : no palpitations [Fast Heart Rate] : heart rate is not fast [Shortness Of Breath] : no shortness of breath [Cough] : no cough [Nausea] : no nausea [Constipation] : no constipation [Vomiting] : no vomiting [Diarrhea] : no diarrhea [Polyuria] : no polyuria [Irregular Menses] : regular menses [Joint Pain] : no joint pain [Muscle Weakness] : no muscle weakness [Acanthosis] : no acanthosis  [Acne] : no acne [Headaches] : no headaches [Dizziness] : no dizziness [Tremors] : no tremors [Pain/Numbness of Digits] : no pain/numbness of digits [Depression] : no depression [Polydipsia] : no polydipsia [Easy Bleeding] : no ~M tendency for easy bleeding [Easy Bruising] : no tendency for easy bruising

## 2023-06-12 NOTE — PHYSICAL EXAM
[Alert] : alert [Well Nourished] : well nourished [No Acute Distress] : no acute distress [Normal Sclera/Conjunctiva] : normal sclera/conjunctiva [PERRL] : pupils equal, round and reactive to light [Normal Outer Ear/Nose] : the ears and nose were normal in appearance [Normal TMs] : both tympanic membranes were normal [No Neck Mass] : no neck mass was observed [Thyroid Not Enlarged] : the thyroid was not enlarged [No Respiratory Distress] : no respiratory distress [Clear to Auscultation] : lungs were clear to auscultation bilaterally [Normal S1, S2] : normal S1 and S2 [Normal Rate] : heart rate was normal [Regular Rhythm] : with a regular rhythm [Normal Bowel Sounds] : normal bowel sounds [Not Tender] : non-tender [Soft] : abdomen soft [Normal Gait] : normal gait [No Clubbing, Cyanosis] : no clubbing  or cyanosis of the fingernails [No Joint Swelling] : no joint swelling seen [Normal Strength/Tone] : muscle strength and tone were normal [No Rash] : no rash [No Skin Lesions] : no skin lesions [No Motor Deficits] : the motor exam was normal [Normal Reflexes] : deep tendon reflexes were 2+ and symmetric [No Tremors] : no tremors [Oriented x3] : oriented to person, place, and time [Normal Affect] : the affect was normal [Normal Insight/Judgement] : insight and judgment were intact

## 2023-06-12 NOTE — HISTORY OF PRESENT ILLNESS
[FreeTextEntry1] : Diabetes Follow-up Patient HPI\par \par \par Just recently on Monday at [facility] ( DKA) and she was released in one day \par \par \par \par HPI:\par \par Duration of Diabetes: 8 years \par Is patient on Insulin? Yes, 8 years \par If yes, how long on insulin? Insulin pump for 4 years ( omnipod with G5) \par \par List Current Medications for Glycemic control and the doses:\par 1- Insulin pump ( Omnipod with DEXCOM G6) \par 2- \par 3- \par \par \par \par 12-6 am : 0.7 units   IC ratio: 1: 15\par 6 pm- 12 am: 0.75 units    IC ratio: 1: 12\par \par ISF : 50\par \par \par \par SMBG (self monitored blood glucose) readings: \par - Name of glucometer: \par - How often does the patient check BG? \par - Does the patient keep a log? \par \par If detailed record is available, what is the range of most of the BG readings?\par - Before Breakfast:140s\par - Before Lunch:100-120\par - Before Dinner:200s\par - Before Bedtime: 200s\par \par \par Does patient get Hypoglycemic episodes? None at all\par \par \par Diabetic Complications: Is patient aware of having any of those complications?\par - Eyes: Retinopathy? None \par  	When was the last fully dilated eye exam?12/2022\par - Feet: 	Neuropathy? None \par  	Foot Ulcers? None \par 	When was the last time patient saw a Podiatrist? None \par - Kidneys: Nephropathy? GFr of 92, microalb/cr: 37 \par \par \par Diet: review patient's diet: \par Breakfast: Bread with peanut butter \par Lunch: Salad, turkey, bread \par Dinner: Salad\par Snacks: Potato chips, pretzels \par Juice or soda: None \par Desserts: None \par \par Increased snacking \par \par Exercise: review patient exercise habits: \par Walking x 3 times per week \par \par Symptoms: Write any symptoms, concerns and issues bothering the patient which have not be addressed above: \par No blurry vision, thirst or urination \par \par

## 2023-06-12 NOTE — ASSESSMENT
[FreeTextEntry1] : 42 year old female with history of DM Type 1 here for follow up. \par She is currently managed on the Omnipod and DEXCOM G6. \par HgA1C of 6.4%, well-controlled. \par She has been having increased hyperglycemia due to increased snacking. \par \par -Upgraded to Omnipod 5 \par -Will be sending DEXCOM report \par -Will change basal rate 0.75 units/hr \par -SMBGs as appropriate \par -Low carb consistent diet\par -BMP, lipid panel, microalb/cr ratio\par \par \par Hot flashes\par -Will be checking for premature menopause \par \par \par -Follow up in 6 months. \par \par

## 2023-06-18 LAB
ALBUMIN SERPL ELPH-MCNC: 4.7 G/DL
ALP BLD-CCNC: 80 U/L
ALT SERPL-CCNC: 13 U/L
ANION GAP SERPL CALC-SCNC: 13 MMOL/L
AST SERPL-CCNC: 18 U/L
BILIRUB SERPL-MCNC: 0.5 MG/DL
BUN SERPL-MCNC: 25 MG/DL
CALCIUM SERPL-MCNC: 9.6 MG/DL
CHLORIDE SERPL-SCNC: 103 MMOL/L
CHOLEST SERPL-MCNC: 187 MG/DL
CO2 SERPL-SCNC: 23 MMOL/L
CREAT SERPL-MCNC: 0.77 MG/DL
CREAT SPEC-SCNC: 136 MG/DL
EGFR: 97 ML/MIN/1.73M2
ESTIMATED AVERAGE GLUCOSE: 163 MG/DL
ESTRADIOL SERPL-MCNC: 10 PG/ML
FSH SERPL-MCNC: 100 IU/L
GLUCOSE SERPL-MCNC: 168 MG/DL
HBA1C MFR BLD HPLC: 7.3 %
HDLC SERPL-MCNC: 64 MG/DL
LDLC SERPL CALC-MCNC: 107 MG/DL
LH SERPL-ACNC: 58.6 IU/L
MICROALBUMIN 24H UR DL<=1MG/L-MCNC: <1.2 MG/DL
MICROALBUMIN/CREAT 24H UR-RTO: NORMAL MG/G
NONHDLC SERPL-MCNC: 124 MG/DL
POTASSIUM SERPL-SCNC: 4.6 MMOL/L
PROT SERPL-MCNC: 7 G/DL
SODIUM SERPL-SCNC: 139 MMOL/L
T4 FREE SERPL-MCNC: 1.3 NG/DL
TRIGL SERPL-MCNC: 84 MG/DL
TSH SERPL-ACNC: 1.28 UIU/ML

## 2023-06-29 ENCOUNTER — NON-APPOINTMENT (OUTPATIENT)
Age: 43
End: 2023-06-29

## 2023-07-28 ENCOUNTER — NON-APPOINTMENT (OUTPATIENT)
Age: 43
End: 2023-07-28

## 2023-08-25 NOTE — PATIENT PROFILE ADULT - FUNCTIONAL SCREEN CURRENT LEVEL: EATING, MLM
Hemigard Postcare Instructions: The HEMIGARD strips are to remain completely dry for at least 5-7 days. 0 = independent

## 2023-10-04 ENCOUNTER — APPOINTMENT (OUTPATIENT)
Dept: INTERNAL MEDICINE | Facility: CLINIC | Age: 43
End: 2023-10-04

## 2023-11-30 RX ORDER — INSULIN PMP CART,AUT,G6/7,CNTR
EACH SUBCUTANEOUS
Qty: 9 | Refills: 3 | Status: ACTIVE | COMMUNITY
Start: 2022-11-04 | End: 1900-01-01

## 2023-12-11 ENCOUNTER — APPOINTMENT (OUTPATIENT)
Dept: ENDOCRINOLOGY | Facility: CLINIC | Age: 43
End: 2023-12-11
Payer: COMMERCIAL

## 2023-12-11 DIAGNOSIS — Z96.41 PRESENCE OF INSULIN PUMP (EXTERNAL) (INTERNAL): ICD-10-CM

## 2023-12-11 DIAGNOSIS — E10.9 TYPE 1 DIABETES MELLITUS W/OUT COMPLICATIONS: ICD-10-CM

## 2023-12-11 PROCEDURE — 99214 OFFICE O/P EST MOD 30 MIN: CPT | Mod: 25

## 2023-12-11 PROCEDURE — 95251 CONT GLUC MNTR ANALYSIS I&R: CPT

## 2023-12-11 PROCEDURE — 83036 HEMOGLOBIN GLYCOSYLATED A1C: CPT | Mod: QW

## 2023-12-18 LAB
ALBUMIN SERPL ELPH-MCNC: 4.6 G/DL
ALP BLD-CCNC: 67 U/L
ALT SERPL-CCNC: 10 U/L
ANION GAP SERPL CALC-SCNC: 13 MMOL/L
AST SERPL-CCNC: 15 U/L
BILIRUB SERPL-MCNC: 0.4 MG/DL
BUN SERPL-MCNC: 15 MG/DL
CALCIUM SERPL-MCNC: 9.8 MG/DL
CHLORIDE SERPL-SCNC: 104 MMOL/L
CHOLEST SERPL-MCNC: 206 MG/DL
CO2 SERPL-SCNC: 24 MMOL/L
CREAT SERPL-MCNC: 0.7 MG/DL
CREAT SPEC-SCNC: 199 MG/DL
EGFR: 109 ML/MIN/1.73M2
ESTIMATED AVERAGE GLUCOSE: 151 MG/DL
GLUCOSE SERPL-MCNC: 149 MG/DL
HBA1C MFR BLD HPLC: 6.9 %
HBA1C MFR BLD HPLC: 7.3
HDLC SERPL-MCNC: 81 MG/DL
LDLC SERPL CALC-MCNC: 115 MG/DL
MICROALBUMIN 24H UR DL<=1MG/L-MCNC: 1.8 MG/DL
MICROALBUMIN/CREAT 24H UR-RTO: 9 MG/G
NONHDLC SERPL-MCNC: 124 MG/DL
POTASSIUM SERPL-SCNC: 4.5 MMOL/L
PROT SERPL-MCNC: 6.7 G/DL
SODIUM SERPL-SCNC: 141 MMOL/L
T4 FREE SERPL-MCNC: 1.2 NG/DL
TRIGL SERPL-MCNC: 52 MG/DL
TSH SERPL-ACNC: 1.14 UIU/ML

## 2023-12-19 RX ORDER — INSULIN LISPRO 100 [IU]/ML
100 INJECTION, SOLUTION INTRAVENOUS; SUBCUTANEOUS
Qty: 10 | Refills: 3 | Status: ACTIVE | COMMUNITY
Start: 2023-12-19 | End: 1900-01-01

## 2024-02-27 RX ORDER — METHYLPREDNISOLONE 4 MG/1
4 TABLET ORAL
Qty: 2 | Refills: 1 | Status: DISCONTINUED | COMMUNITY
Start: 2023-06-08 | End: 2024-02-27

## 2024-02-27 NOTE — PHYSICAL EXAM
[No Acute Distress] : no acute distress [Well Developed] : well developed [Well Nourished] : well nourished [Well-Appearing] : well-appearing [Normal Sclera/Conjunctiva] : normal sclera/conjunctiva [PERRL] : pupils equal round and reactive to light [Normal Outer Ear/Nose] : the outer ears and nose were normal in appearance [EOMI] : extraocular movements intact [Normal Oropharynx] : the oropharynx was normal [No JVD] : no jugular venous distention [No Lymphadenopathy] : no lymphadenopathy [Supple] : supple [No Accessory Muscle Use] : no accessory muscle use [No Respiratory Distress] : no respiratory distress  [Thyroid Normal, No Nodules] : the thyroid was normal and there were no nodules present [Clear to Auscultation] : lungs were clear to auscultation bilaterally [Normal Rate] : normal rate  [Regular Rhythm] : with a regular rhythm [Normal S1, S2] : normal S1 and S2 [No Murmur] : no murmur heard [No Carotid Bruits] : no carotid bruits [No Abdominal Bruit] : a ~M bruit was not heard ~T in the abdomen [Pedal Pulses Present] : the pedal pulses are present [No Varicosities] : no varicosities [No Edema] : there was no peripheral edema [No Palpable Aorta] : no palpable aorta [Soft] : abdomen soft [No Extremity Clubbing/Cyanosis] : no extremity clubbing/cyanosis [Non-distended] : non-distended [Non Tender] : non-tender [No HSM] : no HSM [No Masses] : no abdominal mass palpated [Normal Bowel Sounds] : normal bowel sounds [Normal Posterior Cervical Nodes] : no posterior cervical lymphadenopathy [Normal Anterior Cervical Nodes] : no anterior cervical lymphadenopathy [No CVA Tenderness] : no CVA  tenderness [No Spinal Tenderness] : no spinal tenderness [No Joint Swelling] : no joint swelling [No Rash] : no rash [Grossly Normal Strength/Tone] : grossly normal strength/tone [No Focal Deficits] : no focal deficits [Coordination Grossly Intact] : coordination grossly intact [Normal Gait] : normal gait [Deep Tendon Reflexes (DTR)] : deep tendon reflexes were 2+ and symmetric [Normal Affect] : the affect was normal [Normal Insight/Judgement] : insight and judgment were intact

## 2024-03-04 ENCOUNTER — NON-APPOINTMENT (OUTPATIENT)
Age: 44
End: 2024-03-04

## 2024-03-04 ENCOUNTER — APPOINTMENT (OUTPATIENT)
Dept: OPHTHALMOLOGY | Facility: CLINIC | Age: 44
End: 2024-03-04
Payer: COMMERCIAL

## 2024-03-04 PROCEDURE — 92020 GONIOSCOPY: CPT

## 2024-03-04 PROCEDURE — 76513 OPH US DX ANT SGM US UNI/BI: CPT | Mod: RT

## 2024-03-04 PROCEDURE — 99214 OFFICE O/P EST MOD 30 MIN: CPT

## 2024-03-07 ENCOUNTER — APPOINTMENT (OUTPATIENT)
Dept: INTERNAL MEDICINE | Facility: CLINIC | Age: 44
End: 2024-03-07
Payer: COMMERCIAL

## 2024-03-07 ENCOUNTER — OUTPATIENT (OUTPATIENT)
Dept: OUTPATIENT SERVICES | Facility: HOSPITAL | Age: 44
LOS: 1 days | End: 2024-03-07
Payer: COMMERCIAL

## 2024-03-07 DIAGNOSIS — I10 ESSENTIAL (PRIMARY) HYPERTENSION: ICD-10-CM

## 2024-03-07 DIAGNOSIS — Z00.00 ENCOUNTER FOR GENERAL ADULT MEDICAL EXAMINATION W/OUT ABNORMAL FINDINGS: ICD-10-CM

## 2024-03-07 PROCEDURE — 99396 PREV VISIT EST AGE 40-64: CPT

## 2024-03-07 PROCEDURE — G0463: CPT

## 2024-03-08 VITALS
DIASTOLIC BLOOD PRESSURE: 60 MMHG | WEIGHT: 136 LBS | SYSTOLIC BLOOD PRESSURE: 100 MMHG | HEART RATE: 70 BPM | RESPIRATION RATE: 14 BRPM | BODY MASS INDEX: 24.09 KG/M2

## 2024-03-08 NOTE — HEALTH RISK ASSESSMENT
[Excellent] : ~his/her~  mood as  excellent [Yes] : Yes [Monthly or less (1 pt)] : Monthly or less (1 point) [1 or 2 (0 pts)] : 1 or 2 (0 points) [Never (0 pts)] : Never (0 points) [No] : In the past 12 months have you used drugs other than those required for medical reasons? No [No falls in past year] : Patient reported no falls in the past year [0] : 2) Feeling down, depressed, or hopeless: Not at all (0) [PHQ-2 Negative - No further assessment needed] : PHQ-2 Negative - No further assessment needed [Employed] : employed [None] : None [Feels Safe at Home] : Feels safe at home [Fully functional (bathing, dressing, toileting, transferring, walking, feeding)] : Fully functional (bathing, dressing, toileting, transferring, walking, feeding) [Fully functional (using the telephone, shopping, preparing meals, housekeeping, doing laundry, using] : Fully functional and needs no help or supervision to perform IADLs (using the telephone, shopping, preparing meals, housekeeping, doing laundry, using transportation, managing medications and managing finances) [Reports normal functional visual acuity (ie: able to read med bottle)] : Reports normal functional visual acuity [Smoke Detector] : smoke detector [Seat Belt] :  uses seat belt [Never] : Never [Audit-CScore] : 0 [[id number]] : 0 [Change in mental status noted] : No change in mental status noted [Language] : denies difficulty with language [Behavior] : denies difficulty with behavior [Learning/Retaining New Information] : denies difficulty learning/retaining new information [Handling Complex Tasks] : denies difficulty handling complex tasks [Reasoning] : denies difficulty with reasoning [Spatial Ability and Orientation] : denies difficulty with spatial ability and orientation [Reports changes in hearing] : Reports no changes in hearing [Reports changes in vision] : Reports no changes in vision [Reports changes in dental health] : Reports no changes in dental health

## 2024-03-08 NOTE — HISTORY OF PRESENT ILLNESS
[FreeTextEntry1] : Ms. ORTIZ is here for an annual physical examination and assessment. [de-identified] : She generally feels well with no specific complaints. Her recent health has been good.  Denies headache, dizziness. Denies rash, fatigue, fever, weight loss, anorexia. Denies cough, wheezing. Denies CP, SOB, BYRD, edema, palpitations. Denies abdominal pain, N/V/D/C. Denies BRBPR, melena, dysphagia. Denies dysuria, frequency, hematuria, hesitancy. Denies weakness, numbness, gait instability.

## 2024-03-10 LAB
25(OH)D3 SERPL-MCNC: 33.1 NG/ML
ALBUMIN SERPL ELPH-MCNC: 4.7 G/DL
ALP BLD-CCNC: 67 U/L
ALT SERPL-CCNC: 13 U/L
ANION GAP SERPL CALC-SCNC: 15 MMOL/L
AST SERPL-CCNC: 16 U/L
BASOPHILS # BLD AUTO: 0.07 K/UL
BASOPHILS NFR BLD AUTO: 1.2 %
BILIRUB SERPL-MCNC: 0.7 MG/DL
BUN SERPL-MCNC: 12 MG/DL
CALCIUM SERPL-MCNC: 10 MG/DL
CHLORIDE SERPL-SCNC: 104 MMOL/L
CHOLEST SERPL-MCNC: 182 MG/DL
CO2 SERPL-SCNC: 24 MMOL/L
CREAT SERPL-MCNC: 0.75 MG/DL
EGFR: 100 ML/MIN/1.73M2
EOSINOPHIL # BLD AUTO: 0.08 K/UL
EOSINOPHIL NFR BLD AUTO: 1.4 %
ESTIMATED AVERAGE GLUCOSE: 157 MG/DL
GLUCOSE SERPL-MCNC: 105 MG/DL
HBA1C MFR BLD HPLC: 7.1 %
HCT VFR BLD CALC: 38.9 %
HDLC SERPL-MCNC: 65 MG/DL
HGB BLD-MCNC: 12.6 G/DL
IMM GRANULOCYTES NFR BLD AUTO: 0.2 %
LDLC SERPL CALC-MCNC: 106 MG/DL
LYMPHOCYTES # BLD AUTO: 1.8 K/UL
LYMPHOCYTES NFR BLD AUTO: 31.3 %
MAN DIFF?: NORMAL
MCHC RBC-ENTMCNC: 27.6 PG
MCHC RBC-ENTMCNC: 32.4 GM/DL
MCV RBC AUTO: 85.3 FL
MONOCYTES # BLD AUTO: 0.48 K/UL
MONOCYTES NFR BLD AUTO: 8.3 %
NEUTROPHILS # BLD AUTO: 3.32 K/UL
NEUTROPHILS NFR BLD AUTO: 57.6 %
NONHDLC SERPL-MCNC: 117 MG/DL
PLATELET # BLD AUTO: 267 K/UL
POTASSIUM SERPL-SCNC: 4.5 MMOL/L
PROT SERPL-MCNC: 6.9 G/DL
RBC # BLD: 4.56 M/UL
RBC # FLD: 12.1 %
SODIUM SERPL-SCNC: 143 MMOL/L
TRIGL SERPL-MCNC: 54 MG/DL
WBC # FLD AUTO: 5.76 K/UL

## 2024-03-11 ENCOUNTER — TRANSCRIPTION ENCOUNTER (OUTPATIENT)
Age: 44
End: 2024-03-11

## 2024-03-13 DIAGNOSIS — Z00.00 ENCOUNTER FOR GENERAL ADULT MEDICAL EXAMINATION WITHOUT ABNORMAL FINDINGS: ICD-10-CM

## 2024-05-21 ENCOUNTER — APPOINTMENT (OUTPATIENT)
Dept: MAMMOGRAPHY | Facility: CLINIC | Age: 44
End: 2024-05-21
Payer: COMMERCIAL

## 2024-05-21 ENCOUNTER — RESULT REVIEW (OUTPATIENT)
Age: 44
End: 2024-05-21

## 2024-05-21 ENCOUNTER — OUTPATIENT (OUTPATIENT)
Dept: OUTPATIENT SERVICES | Facility: HOSPITAL | Age: 44
LOS: 1 days | End: 2024-05-21
Payer: COMMERCIAL

## 2024-05-21 ENCOUNTER — APPOINTMENT (OUTPATIENT)
Dept: ULTRASOUND IMAGING | Facility: CLINIC | Age: 44
End: 2024-05-21
Payer: COMMERCIAL

## 2024-05-21 DIAGNOSIS — Z12.31 ENCOUNTER FOR SCREENING MAMMOGRAM FOR MALIGNANT NEOPLASM OF BREAST: ICD-10-CM

## 2024-05-21 PROCEDURE — 77063 BREAST TOMOSYNTHESIS BI: CPT | Mod: 26

## 2024-05-21 PROCEDURE — 76775 US EXAM ABDO BACK WALL LIM: CPT

## 2024-05-21 PROCEDURE — 77067 SCR MAMMO BI INCL CAD: CPT | Mod: 26

## 2024-05-21 PROCEDURE — 76775 US EXAM ABDO BACK WALL LIM: CPT | Mod: 26

## 2024-05-21 PROCEDURE — 77063 BREAST TOMOSYNTHESIS BI: CPT

## 2024-05-21 PROCEDURE — 77067 SCR MAMMO BI INCL CAD: CPT

## 2024-05-28 ENCOUNTER — APPOINTMENT (OUTPATIENT)
Dept: OPHTHALMOLOGY | Facility: CLINIC | Age: 44
End: 2024-05-28

## 2024-05-31 RX ORDER — INSULIN ASPART 100 [IU]/ML
100 INJECTION, SOLUTION INTRAVENOUS; SUBCUTANEOUS
Qty: 9 | Refills: 3 | Status: ACTIVE | COMMUNITY
Start: 2024-05-31 | End: 1900-01-01

## 2024-07-16 ENCOUNTER — APPOINTMENT (OUTPATIENT)
Dept: ENDOCRINOLOGY | Facility: CLINIC | Age: 44
End: 2024-07-16
Payer: COMMERCIAL

## 2024-07-16 VITALS
SYSTOLIC BLOOD PRESSURE: 100 MMHG | OXYGEN SATURATION: 98 % | WEIGHT: 136 LBS | BODY MASS INDEX: 24.1 KG/M2 | HEART RATE: 70 BPM | HEIGHT: 63 IN | DIASTOLIC BLOOD PRESSURE: 62 MMHG

## 2024-07-16 DIAGNOSIS — E10.9 TYPE 1 DIABETES MELLITUS W/OUT COMPLICATIONS: ICD-10-CM

## 2024-07-16 DIAGNOSIS — Z96.41 PRESENCE OF INSULIN PUMP (EXTERNAL) (INTERNAL): ICD-10-CM

## 2024-07-16 DIAGNOSIS — L65.9 NONSCARRING HAIR LOSS, UNSPECIFIED: ICD-10-CM

## 2024-07-16 DIAGNOSIS — N95.1 MENOPAUSAL AND FEMALE CLIMACTERIC STATES: ICD-10-CM

## 2024-07-16 LAB — HBA1C MFR BLD HPLC: 6.7

## 2024-07-16 PROCEDURE — 95251 CONT GLUC MNTR ANALYSIS I&R: CPT

## 2024-07-16 PROCEDURE — 83036 HEMOGLOBIN GLYCOSYLATED A1C: CPT | Mod: QW

## 2024-07-16 PROCEDURE — 99244 OFF/OP CNSLTJ NEW/EST MOD 40: CPT

## 2024-07-16 RX ORDER — INSULIN LISPRO 100 [IU]/ML
100 INJECTION, SOLUTION INTRAVENOUS; SUBCUTANEOUS
Qty: 5 | Refills: 1 | Status: ACTIVE | COMMUNITY
Start: 2024-07-16 | End: 1900-01-01

## 2024-07-31 ENCOUNTER — APPOINTMENT (OUTPATIENT)
Dept: INTERNAL MEDICINE | Facility: CLINIC | Age: 44
End: 2024-07-31

## 2024-07-31 RX ORDER — INSULIN GLARGINE-YFGN 100 [IU]/ML
100 INJECTION, SOLUTION SUBCUTANEOUS
Qty: 5 | Refills: 1 | Status: ACTIVE | COMMUNITY
Start: 2024-07-31 | End: 1900-01-01

## 2024-07-31 RX ORDER — INSULIN ASPART 100 [IU]/ML
100 INJECTION, SOLUTION INTRAVENOUS; SUBCUTANEOUS
Qty: 1 | Refills: 1 | Status: ACTIVE | COMMUNITY
Start: 2024-07-31 | End: 1900-01-01

## 2024-09-11 ENCOUNTER — TRANSCRIPTION ENCOUNTER (OUTPATIENT)
Age: 44
End: 2024-09-11

## 2024-10-14 ENCOUNTER — APPOINTMENT (OUTPATIENT)
Dept: ENDOCRINOLOGY | Facility: CLINIC | Age: 44
End: 2024-10-14

## 2024-10-14 DIAGNOSIS — E10.9 TYPE 1 DIABETES MELLITUS W/OUT COMPLICATIONS: ICD-10-CM

## 2024-10-14 DIAGNOSIS — E55.9 VITAMIN D DEFICIENCY, UNSPECIFIED: ICD-10-CM

## 2024-10-14 DIAGNOSIS — Z96.41 PRESENCE OF INSULIN PUMP (EXTERNAL) (INTERNAL): ICD-10-CM

## 2025-01-13 ENCOUNTER — RX RENEWAL (OUTPATIENT)
Age: 45
End: 2025-01-13

## 2025-01-14 ENCOUNTER — NON-APPOINTMENT (OUTPATIENT)
Age: 45
End: 2025-01-14

## 2025-01-14 ENCOUNTER — APPOINTMENT (OUTPATIENT)
Dept: CARDIOLOGY | Facility: CLINIC | Age: 45
End: 2025-01-14
Payer: COMMERCIAL

## 2025-01-14 ENCOUNTER — APPOINTMENT (OUTPATIENT)
Dept: ELECTROPHYSIOLOGY | Facility: CLINIC | Age: 45
End: 2025-01-14

## 2025-01-14 VITALS
WEIGHT: 135 LBS | HEART RATE: 70 BPM | DIASTOLIC BLOOD PRESSURE: 69 MMHG | OXYGEN SATURATION: 100 % | HEIGHT: 63 IN | BODY MASS INDEX: 23.92 KG/M2 | SYSTOLIC BLOOD PRESSURE: 101 MMHG

## 2025-01-14 DIAGNOSIS — E10.9 TYPE 1 DIABETES MELLITUS W/OUT COMPLICATIONS: ICD-10-CM

## 2025-01-14 DIAGNOSIS — R06.00 DYSPNEA, UNSPECIFIED: ICD-10-CM

## 2025-01-14 DIAGNOSIS — R00.2 PALPITATIONS: ICD-10-CM

## 2025-01-14 PROCEDURE — G2211 COMPLEX E/M VISIT ADD ON: CPT

## 2025-01-14 PROCEDURE — 93000 ELECTROCARDIOGRAM COMPLETE: CPT

## 2025-01-14 PROCEDURE — 99204 OFFICE O/P NEW MOD 45 MIN: CPT

## 2025-01-14 RX ORDER — CHLORHEXIDINE GLUCONATE 4 %
LIQUID (ML) TOPICAL
Refills: 0 | Status: ACTIVE | COMMUNITY

## 2025-01-28 ENCOUNTER — OUTPATIENT (OUTPATIENT)
Dept: OUTPATIENT SERVICES | Facility: HOSPITAL | Age: 45
LOS: 1 days | End: 2025-01-28
Payer: COMMERCIAL

## 2025-01-28 ENCOUNTER — APPOINTMENT (OUTPATIENT)
Dept: CV DIAGNOSTICS | Facility: HOSPITAL | Age: 45
End: 2025-01-28

## 2025-01-28 ENCOUNTER — RX RENEWAL (OUTPATIENT)
Age: 45
End: 2025-01-28

## 2025-01-28 ENCOUNTER — APPOINTMENT (OUTPATIENT)
Dept: CARDIOLOGY | Facility: CLINIC | Age: 45
End: 2025-01-28

## 2025-01-28 ENCOUNTER — RESULT REVIEW (OUTPATIENT)
Age: 45
End: 2025-01-28

## 2025-01-28 DIAGNOSIS — R06.00 DYSPNEA, UNSPECIFIED: ICD-10-CM

## 2025-01-28 PROCEDURE — 93244 EXT ECG>48HR<7D REV&INTERPJ: CPT

## 2025-01-28 PROCEDURE — 93017 CV STRESS TEST TRACING ONLY: CPT

## 2025-01-28 PROCEDURE — 93018 CV STRESS TEST I&R ONLY: CPT

## 2025-01-28 PROCEDURE — 93306 TTE W/DOPPLER COMPLETE: CPT

## 2025-01-28 PROCEDURE — 93016 CV STRESS TEST SUPVJ ONLY: CPT

## 2025-02-11 ENCOUNTER — APPOINTMENT (OUTPATIENT)
Dept: CARDIOLOGY | Facility: CLINIC | Age: 45
End: 2025-02-11

## 2025-02-25 ENCOUNTER — APPOINTMENT (OUTPATIENT)
Dept: ENDOCRINOLOGY | Facility: CLINIC | Age: 45
End: 2025-02-25
Payer: COMMERCIAL

## 2025-02-25 VITALS
SYSTOLIC BLOOD PRESSURE: 116 MMHG | DIASTOLIC BLOOD PRESSURE: 70 MMHG | WEIGHT: 134 LBS | HEART RATE: 79 BPM | BODY MASS INDEX: 23.74 KG/M2 | HEIGHT: 63 IN | OXYGEN SATURATION: 98 %

## 2025-02-25 DIAGNOSIS — Z96.41 PRESENCE OF INSULIN PUMP (EXTERNAL) (INTERNAL): ICD-10-CM

## 2025-02-25 DIAGNOSIS — N95.1 MENOPAUSAL AND FEMALE CLIMACTERIC STATES: ICD-10-CM

## 2025-02-25 DIAGNOSIS — E10.9 TYPE 1 DIABETES MELLITUS W/OUT COMPLICATIONS: ICD-10-CM

## 2025-02-25 PROCEDURE — 95251 CONT GLUC MNTR ANALYSIS I&R: CPT

## 2025-02-25 PROCEDURE — 99214 OFFICE O/P EST MOD 30 MIN: CPT

## 2025-02-25 PROCEDURE — 83036 HEMOGLOBIN GLYCOSYLATED A1C: CPT | Mod: QW

## 2025-02-25 RX ORDER — METFORMIN ER 500 MG 500 MG/1
500 TABLET ORAL TWICE DAILY
Qty: 2 | Refills: 3 | Status: ACTIVE | COMMUNITY
Start: 2025-02-25 | End: 1900-01-01

## 2025-02-26 ENCOUNTER — TRANSCRIPTION ENCOUNTER (OUTPATIENT)
Age: 45
End: 2025-02-26

## 2025-02-26 LAB
CREAT SPEC-SCNC: 34 MG/DL
HBA1C MFR BLD HPLC: 6.7
MICROALBUMIN 24H UR DL<=1MG/L-MCNC: <1.2 MG/DL
MICROALBUMIN/CREAT 24H UR-RTO: NORMAL MG/G

## 2025-03-11 ENCOUNTER — TRANSCRIPTION ENCOUNTER (OUTPATIENT)
Age: 45
End: 2025-03-11

## 2025-03-11 DIAGNOSIS — R30.0 DYSURIA: ICD-10-CM

## 2025-03-12 LAB
APPEARANCE: CLEAR
BILIRUBIN URINE: NEGATIVE
BLOOD URINE: NEGATIVE
COLOR: YELLOW
GLUCOSE QUALITATIVE U: NEGATIVE MG/DL
KETONES URINE: ABNORMAL MG/DL
LEUKOCYTE ESTERASE URINE: NEGATIVE
NITRITE URINE: NEGATIVE
PH URINE: 6.5
PROTEIN URINE: NEGATIVE MG/DL
SPECIFIC GRAVITY URINE: 1.02
UROBILINOGEN URINE: 0.2 MG/DL

## 2025-03-13 ENCOUNTER — TRANSCRIPTION ENCOUNTER (OUTPATIENT)
Age: 45
End: 2025-03-13

## 2025-03-13 LAB
ALBUMIN SERPL ELPH-MCNC: 4.3 G/DL
ALP BLD-CCNC: 73 U/L
ALT SERPL-CCNC: 8 U/L
ANION GAP SERPL CALC-SCNC: 11 MMOL/L
AST SERPL-CCNC: 13 U/L
BILIRUB SERPL-MCNC: 0.4 MG/DL
BUN SERPL-MCNC: 13 MG/DL
CALCIUM SERPL-MCNC: 9.4 MG/DL
CHLORIDE SERPL-SCNC: 105 MMOL/L
CO2 SERPL-SCNC: 25 MMOL/L
CREAT SERPL-MCNC: 0.63 MG/DL
EGFRCR SERPLBLD CKD-EPI 2021: 111 ML/MIN/1.73M2
GLUCOSE SERPL-MCNC: 130 MG/DL
POTASSIUM SERPL-SCNC: 4.3 MMOL/L
PROT SERPL-MCNC: 6.6 G/DL
SODIUM SERPL-SCNC: 141 MMOL/L

## 2025-03-17 ENCOUNTER — NON-APPOINTMENT (OUTPATIENT)
Age: 45
End: 2025-03-17

## 2025-03-17 ENCOUNTER — APPOINTMENT (OUTPATIENT)
Dept: OPHTHALMOLOGY | Facility: CLINIC | Age: 45
End: 2025-03-17
Payer: COMMERCIAL

## 2025-03-17 PROCEDURE — 92012 INTRM OPH EXAM EST PATIENT: CPT

## 2025-03-17 PROCEDURE — 92020 GONIOSCOPY: CPT

## 2025-04-14 ENCOUNTER — APPOINTMENT (OUTPATIENT)
Dept: OPHTHALMOLOGY | Facility: CLINIC | Age: 45
End: 2025-04-14

## 2025-05-09 ENCOUNTER — APPOINTMENT (OUTPATIENT)
Dept: INTERNAL MEDICINE | Facility: CLINIC | Age: 45
End: 2025-05-09
Payer: COMMERCIAL

## 2025-05-09 ENCOUNTER — OUTPATIENT (OUTPATIENT)
Dept: OUTPATIENT SERVICES | Facility: HOSPITAL | Age: 45
LOS: 1 days | End: 2025-05-09
Payer: COMMERCIAL

## 2025-05-09 DIAGNOSIS — I10 ESSENTIAL (PRIMARY) HYPERTENSION: ICD-10-CM

## 2025-05-09 DIAGNOSIS — D17.9 BENIGN LIPOMATOUS NEOPLASM, UNSPECIFIED: ICD-10-CM

## 2025-05-09 DIAGNOSIS — Z00.00 ENCOUNTER FOR GENERAL ADULT MEDICAL EXAMINATION W/OUT ABNORMAL FINDINGS: ICD-10-CM

## 2025-05-09 PROCEDURE — 99396 PREV VISIT EST AGE 40-64: CPT

## 2025-05-09 PROCEDURE — G0463: CPT

## 2025-05-10 VITALS
DIASTOLIC BLOOD PRESSURE: 70 MMHG | SYSTOLIC BLOOD PRESSURE: 120 MMHG | WEIGHT: 136 LBS | HEART RATE: 80 BPM | BODY MASS INDEX: 24.09 KG/M2 | RESPIRATION RATE: 14 BRPM

## 2025-05-10 LAB
25(OH)D3 SERPL-MCNC: 37.8 NG/ML
ALBUMIN SERPL ELPH-MCNC: 4.4 G/DL
ALP BLD-CCNC: 72 U/L
ALT SERPL-CCNC: 9 U/L
ANION GAP SERPL CALC-SCNC: 14 MMOL/L
AST SERPL-CCNC: 15 U/L
BASOPHILS # BLD AUTO: 0.06 K/UL
BASOPHILS NFR BLD AUTO: 1 %
BILIRUB SERPL-MCNC: 0.4 MG/DL
BUN SERPL-MCNC: 16 MG/DL
CALCIUM SERPL-MCNC: 9.7 MG/DL
CHLORIDE SERPL-SCNC: 101 MMOL/L
CHOLEST SERPL-MCNC: 208 MG/DL
CO2 SERPL-SCNC: 22 MMOL/L
CORTIS SERPL-MCNC: 3.4 UG/DL
CREAT SERPL-MCNC: 0.66 MG/DL
EGFRCR SERPLBLD CKD-EPI 2021: 109 ML/MIN/1.73M2
EOSINOPHIL # BLD AUTO: 0.12 K/UL
EOSINOPHIL NFR BLD AUTO: 1.9 %
ESTIMATED AVERAGE GLUCOSE: 157 MG/DL
GLUCOSE SERPL-MCNC: 121 MG/DL
HBA1C MFR BLD HPLC: 7.1 %
HCT VFR BLD CALC: 36.6 %
HDLC SERPL-MCNC: 69 MG/DL
HGB BLD-MCNC: 11.8 G/DL
IMM GRANULOCYTES NFR BLD AUTO: 0.2 %
LDLC SERPL-MCNC: 126 MG/DL
LYMPHOCYTES # BLD AUTO: 2.01 K/UL
LYMPHOCYTES NFR BLD AUTO: 32.3 %
MAN DIFF?: NORMAL
MCHC RBC-ENTMCNC: 28 PG
MCHC RBC-ENTMCNC: 32.2 G/DL
MCV RBC AUTO: 86.7 FL
MONOCYTES # BLD AUTO: 0.42 K/UL
MONOCYTES NFR BLD AUTO: 6.7 %
NEUTROPHILS # BLD AUTO: 3.61 K/UL
NEUTROPHILS NFR BLD AUTO: 57.9 %
NONHDLC SERPL-MCNC: 140 MG/DL
PLATELET # BLD AUTO: 219 K/UL
POTASSIUM SERPL-SCNC: 4.4 MMOL/L
PROT SERPL-MCNC: 6.5 G/DL
RBC # BLD: 4.22 M/UL
RBC # FLD: 12.3 %
SODIUM SERPL-SCNC: 137 MMOL/L
TRIGL SERPL-MCNC: 75 MG/DL
WBC # FLD AUTO: 6.23 K/UL

## 2025-05-19 DIAGNOSIS — D17.9 BENIGN LIPOMATOUS NEOPLASM, UNSPECIFIED: ICD-10-CM

## 2025-05-19 DIAGNOSIS — Z00.00 ENCOUNTER FOR GENERAL ADULT MEDICAL EXAMINATION WITHOUT ABNORMAL FINDINGS: ICD-10-CM

## 2025-07-09 ENCOUNTER — APPOINTMENT (OUTPATIENT)
Dept: OPHTHALMOLOGY | Facility: CLINIC | Age: 45
End: 2025-07-09

## 2025-07-23 ENCOUNTER — APPOINTMENT (OUTPATIENT)
Dept: INTERNAL MEDICINE | Facility: CLINIC | Age: 45
End: 2025-07-23
Payer: COMMERCIAL

## 2025-07-23 ENCOUNTER — OUTPATIENT (OUTPATIENT)
Dept: OUTPATIENT SERVICES | Facility: HOSPITAL | Age: 45
LOS: 1 days | End: 2025-07-23
Payer: COMMERCIAL

## 2025-07-23 DIAGNOSIS — M79.89 OTHER SPECIFIED SOFT TISSUE DISORDERS: ICD-10-CM

## 2025-07-23 DIAGNOSIS — E10.9 TYPE 1 DIABETES MELLITUS W/OUT COMPLICATIONS: ICD-10-CM

## 2025-07-23 DIAGNOSIS — I10 ESSENTIAL (PRIMARY) HYPERTENSION: ICD-10-CM

## 2025-07-23 DIAGNOSIS — R29.898 OTHER SYMPTOMS AND SIGNS INVOLVING THE MUSCULOSKELETAL SYSTEM: ICD-10-CM

## 2025-07-23 PROCEDURE — G2211 COMPLEX E/M VISIT ADD ON: CPT

## 2025-07-23 PROCEDURE — G0463: CPT

## 2025-07-23 PROCEDURE — 99215 OFFICE O/P EST HI 40 MIN: CPT

## 2025-07-28 DIAGNOSIS — E10.9 TYPE 1 DIABETES MELLITUS WITHOUT COMPLICATIONS: ICD-10-CM

## 2025-07-28 DIAGNOSIS — M79.89 OTHER SPECIFIED SOFT TISSUE DISORDERS: ICD-10-CM

## 2025-07-28 DIAGNOSIS — R29.898 OTHER SYMPTOMS AND SIGNS INVOLVING THE MUSCULOSKELETAL SYSTEM: ICD-10-CM

## 2025-07-29 ENCOUNTER — APPOINTMENT (OUTPATIENT)
Dept: ULTRASOUND IMAGING | Facility: CLINIC | Age: 45
End: 2025-07-29
Payer: COMMERCIAL

## 2025-07-29 ENCOUNTER — OUTPATIENT (OUTPATIENT)
Dept: OUTPATIENT SERVICES | Facility: HOSPITAL | Age: 45
LOS: 1 days | End: 2025-07-29
Payer: COMMERCIAL

## 2025-07-29 DIAGNOSIS — M79.89 OTHER SPECIFIED SOFT TISSUE DISORDERS: ICD-10-CM

## 2025-07-29 PROCEDURE — 76882 US LMTD JT/FCL EVL NVASC XTR: CPT | Mod: 26,LT

## 2025-07-29 PROCEDURE — 76775 US EXAM ABDO BACK WALL LIM: CPT | Mod: 26

## 2025-07-29 PROCEDURE — 76882 US LMTD JT/FCL EVL NVASC XTR: CPT

## 2025-07-29 PROCEDURE — 76775 US EXAM ABDO BACK WALL LIM: CPT

## 2025-08-04 ENCOUNTER — APPOINTMENT (OUTPATIENT)
Dept: MAMMOGRAPHY | Facility: CLINIC | Age: 45
End: 2025-08-04

## 2025-09-16 ENCOUNTER — APPOINTMENT (OUTPATIENT)
Dept: ENDOCRINOLOGY | Facility: CLINIC | Age: 45
End: 2025-09-16
Payer: COMMERCIAL

## 2025-09-16 VITALS
BODY MASS INDEX: 23.21 KG/M2 | SYSTOLIC BLOOD PRESSURE: 112 MMHG | HEIGHT: 63 IN | OXYGEN SATURATION: 96 % | DIASTOLIC BLOOD PRESSURE: 80 MMHG | HEART RATE: 101 BPM | WEIGHT: 131 LBS

## 2025-09-16 DIAGNOSIS — E55.9 VITAMIN D DEFICIENCY, UNSPECIFIED: ICD-10-CM

## 2025-09-16 DIAGNOSIS — Z96.41 PRESENCE OF INSULIN PUMP (EXTERNAL) (INTERNAL): ICD-10-CM

## 2025-09-16 DIAGNOSIS — E10.9 TYPE 1 DIABETES MELLITUS W/OUT COMPLICATIONS: ICD-10-CM

## 2025-09-16 PROCEDURE — 95251 CONT GLUC MNTR ANALYSIS I&R: CPT

## 2025-09-16 PROCEDURE — 99214 OFFICE O/P EST MOD 30 MIN: CPT

## 2025-09-16 PROCEDURE — 83036 HEMOGLOBIN GLYCOSYLATED A1C: CPT | Mod: QW

## 2025-09-17 ENCOUNTER — APPOINTMENT (OUTPATIENT)
Dept: OPHTHALMOLOGY | Facility: CLINIC | Age: 45
End: 2025-09-17

## 2025-09-17 LAB — HBA1C MFR BLD HPLC: 6.6
